# Patient Record
Sex: MALE | Race: WHITE | Employment: UNEMPLOYED | URBAN - METROPOLITAN AREA
[De-identification: names, ages, dates, MRNs, and addresses within clinical notes are randomized per-mention and may not be internally consistent; named-entity substitution may affect disease eponyms.]

---

## 2019-05-18 ENCOUNTER — HOSPITAL ENCOUNTER (EMERGENCY)
Age: 49
Discharge: HOME | End: 2019-05-18
Payer: COMMERCIAL

## 2019-05-18 VITALS
DIASTOLIC BLOOD PRESSURE: 78 MMHG | OXYGEN SATURATION: 95 % | WEIGHT: 210 LBS | BODY MASS INDEX: 28.44 KG/M2 | HEART RATE: 74 BPM | HEIGHT: 72 IN | TEMPERATURE: 97.6 F | RESPIRATION RATE: 19 BRPM | SYSTOLIC BLOOD PRESSURE: 135 MMHG

## 2019-05-18 DIAGNOSIS — K02.9 PAIN DUE TO DENTAL CARIES: ICD-10-CM

## 2019-05-18 DIAGNOSIS — K04.7 DENTAL ABSCESS: Primary | ICD-10-CM

## 2019-05-18 PROCEDURE — 6370000000 HC RX 637 (ALT 250 FOR IP): Performed by: NURSE PRACTITIONER

## 2019-05-18 PROCEDURE — 99282 EMERGENCY DEPT VISIT SF MDM: CPT

## 2019-05-18 RX ORDER — IBUPROFEN 800 MG/1
800 TABLET ORAL EVERY 6 HOURS PRN
Qty: 21 TABLET | Refills: 0 | Status: SHIPPED | OUTPATIENT
Start: 2019-05-18 | End: 2019-07-02 | Stop reason: ALTCHOICE

## 2019-05-18 RX ORDER — IBUPROFEN 800 MG/1
800 TABLET ORAL ONCE
Status: COMPLETED | OUTPATIENT
Start: 2019-05-18 | End: 2019-05-18

## 2019-05-18 RX ORDER — OXYCODONE HYDROCHLORIDE AND ACETAMINOPHEN 5; 325 MG/1; MG/1
1 TABLET ORAL ONCE
Status: COMPLETED | OUTPATIENT
Start: 2019-05-18 | End: 2019-05-18

## 2019-05-18 RX ORDER — PENICILLIN V POTASSIUM 500 MG/1
500 TABLET ORAL 4 TIMES DAILY
Qty: 40 TABLET | Refills: 0 | Status: SHIPPED | OUTPATIENT
Start: 2019-05-18 | End: 2019-05-28

## 2019-05-18 RX ADMIN — IBUPROFEN 800 MG: 800 TABLET ORAL at 14:01

## 2019-05-18 RX ADMIN — OXYCODONE HYDROCHLORIDE AND ACETAMINOPHEN 1 TABLET: 5; 325 TABLET ORAL at 12:15

## 2019-05-18 ASSESSMENT — PAIN SCALES - GENERAL
PAINLEVEL_OUTOF10: 9
PAINLEVEL_OUTOF10: 8

## 2019-05-18 NOTE — PROGRESS NOTES
S: Pt presents with a complaint of dental pain and left facial swelling. The patient states this pain has beem experiencing pain for the last few days  and worse today which is what prompted the visit. Pain has not been relieved with any OTC medications. Patient states that the swelling came on last night. Patient denies any fever. The pt  denies difficulty breathing or swallowing. The location of the pain and appears to be isolated over tooth #19/20. O: 58 y/o  male  ASA II   IO exam: limited exam performed ;missing teeth, caries, fractured teeth, fluctuant swelling in the area of tooth # 19 and 20     Tooth #19 dental Caries, fractured, tender to palpation and percussion    EO exam : left facial swelling, slightly indurated, tender to palpation    P:Diagnosis and treatment explained to pt. Pt explained that incision and drainage needs to be performed on the lower left side. Pt informed that he needs to follow up at the dental clinic Monday morning at 8:00 am or 1:00pm clinic for further evaluation and needed treatment. Pt understands and agrees. 20 % benzocaine applied. 2 carps of 2 % lidocaine 1:100K used to anesthetize. # 15 used to place an incision in the area of tooth # 19. Area irrigated with sterile water using monojet. Johnsie Ronde drain placed using silk suture. Pt prescribed Amoxicillin, Ibuprofen and Tylenol    Follow up Dental clinic Monday Morning     Dr. Yao , STEPH    I discussed the case, including pertinent history and exam findings with the resident and the team. I agree with the assessment, plan, and treatment as documented by the resident. The ED Physician provided direct supervision.    Electronically signed by Ren Webster DDS

## 2019-05-18 NOTE — ED PROVIDER NOTES
ED Attending  CC: No    HPI: Russel Yan 57 y.o. male with a past medical history of History reviewed. No pertinent past medical history. presents with a complaint of dental pain. The patient states this pain has been gradual in onset, persistent, moderate in severity and worse today which is what prompted the visit. Pain has not been relieved with any OTC medications. Patient denies any unilateral facial swelling. Patient is able to handle their own secretions and drink fluids without difficulty. Patient denies any fever. The patient also denies any history of dental trauma. Denies difficulty breathing or swallowing. The location of the pain and appears to be isolated over tooth number 18, 19. Onset last night and progressively worse. No fever     ROS:   Pertinent positives and negatives are stated within HPI, all other systems reviewed and are negative.  --------------------------------------------- PAST HISTORY ---------------------------------------------  Past Medical History:  has no past medical history on file. Past Surgical History:  has a past surgical history that includes Nose surgery. Social History:  reports that he has been smoking. He has been smoking about 1.00 pack per day. He does not have any smokeless tobacco history on file. He reports that he drinks alcohol. He reports that he has current or past drug history. Drug: Opiates . Family History: family history includes Cancer in his father and mother. The patients home medications have been reviewed. Allergies: Patient has no known allergies. ------------------------- NURSING NOTES AND VITALS REVIEWED ---------------------------   The nursing notes within the ED encounter and vital signs as below have been reviewed by myself.   /78   Pulse 74   Temp 97.6 °F (36.4 °C)   Resp 19   Ht 6' (1.829 m)   Wt 210 lb (95.3 kg)   SpO2 95%   BMI 28.48 kg/m²   Oxygen Saturation Interpretation: Normal    The patients available past medical records and past encounters were reviewed. Physical exam:  Constitutional: The patient is comfortable, alert and oriented x3, well appearing, non toxic in NAD. Head: Atraumatic and normocephalic. Eyes: No discharge from the eyes the sclerae are normal.  ENT: The oropharynx is normal. No pharyngeal erythema, uvular edema, tonsillar exudates, asymmetry or trismus. Mouth is normal to inspection  With the exception of a pain on percussion of the tooth noted above. There is evidence of facial asymmetry and is abscess formation. Floor of the mouth is soft. No tenderness in the submental or submandibular space. No tongue elevation. Significant dental caries noted at the stated teeth with evidence of abscess formation. Neck: The neck demonstrates normal range of motion. No meningeals signs are present. No stridor. Respiratory/chest: The chest is nontender. Breath sounds are normal. no respiratory distress is noted  Cardiovascular: Heart shows a regular rate and rhythm no murmurs no rubs no gallops. Skin: The skin exam shows no evidence of rashes  Neuro: GCS is 15  Lymphatic: No cervical lymphadenopathy       -------------------------------------------------- RESULTS -------------------------------------------------  I have personally reviewed all laboratory and imaging results for this patient. Results are listed below. LABS:  No results found for this visit on 05/18/19. RADIOLOGY:  Interpreted by Radiologist.  No orders to display          1846- call to dental resident will be in to eval the patient  1400- provided with information to follow the dental clinic on Monday morning and started on antibiotics.     Medical Decision Making: Exam and history c/w  dental pain without evidence of gross infection.  At this time we will  the patient on following up in dental clinic and provide pain relief.         --------------------------------- IMPRESSION AND DISPOSITION ---------------------------------    IMPRESSION  1. Dental abscess    2.  Pain due to dental caries        DISPOSITION  Disposition: Discharge to home  Patient condition is good              Isaias Burton, MYNOR - ADRIAN  05/18/19 [id number]

## 2019-07-02 ENCOUNTER — HOSPITAL ENCOUNTER (EMERGENCY)
Age: 49
Discharge: HOME | End: 2019-07-02
Payer: COMMERCIAL

## 2019-07-02 VITALS
RESPIRATION RATE: 16 BRPM | OXYGEN SATURATION: 97 % | SYSTOLIC BLOOD PRESSURE: 133 MMHG | HEART RATE: 100 BPM | BODY MASS INDEX: 35.55 KG/M2 | TEMPERATURE: 97.7 F | WEIGHT: 240 LBS | DIASTOLIC BLOOD PRESSURE: 91 MMHG | HEIGHT: 69 IN

## 2019-07-02 DIAGNOSIS — M54.32 SCIATICA OF LEFT SIDE: Primary | ICD-10-CM

## 2019-07-02 PROCEDURE — 96372 THER/PROPH/DIAG INJ SC/IM: CPT

## 2019-07-02 PROCEDURE — 99282 EMERGENCY DEPT VISIT SF MDM: CPT

## 2019-07-02 PROCEDURE — 6360000002 HC RX W HCPCS: Performed by: PHYSICIAN ASSISTANT

## 2019-07-02 RX ORDER — KETOROLAC TROMETHAMINE 30 MG/ML
30 INJECTION, SOLUTION INTRAMUSCULAR; INTRAVENOUS ONCE
Status: COMPLETED | OUTPATIENT
Start: 2019-07-02 | End: 2019-07-02

## 2019-07-02 RX ORDER — PREDNISONE 10 MG/1
40 TABLET ORAL DAILY
Qty: 20 TABLET | Refills: 0 | Status: SHIPPED | OUTPATIENT
Start: 2019-07-02 | End: 2019-07-07

## 2019-07-02 RX ORDER — CYCLOBENZAPRINE HCL 10 MG
10 TABLET ORAL 3 TIMES DAILY PRN
Qty: 15 TABLET | Refills: 0 | Status: SHIPPED | OUTPATIENT
Start: 2019-07-02 | End: 2019-07-07

## 2019-07-02 RX ADMIN — KETOROLAC TROMETHAMINE 30 MG: 30 INJECTION, SOLUTION INTRAMUSCULAR; INTRAVENOUS at 09:05

## 2019-07-02 ASSESSMENT — PAIN DESCRIPTION - PAIN TYPE: TYPE: CHRONIC PAIN

## 2019-07-02 ASSESSMENT — PAIN SCALES - GENERAL
PAINLEVEL_OUTOF10: 10
PAINLEVEL_OUTOF10: 10

## 2019-07-02 ASSESSMENT — PAIN DESCRIPTION - LOCATION: LOCATION: BACK

## 2019-07-02 ASSESSMENT — PAIN DESCRIPTION - ORIENTATION: ORIENTATION: LOWER

## 2019-07-02 NOTE — ED PROVIDER NOTES
kg)      Oxygen Saturation Interpretation: Normal.    Constitutional:  Alert, development consistent with age. HEENT:  NC/NT. Airway patent. Neck:  Normal ROM. Supple. Respiratory:  Clear to auscultation and breath sounds equal.  CV:  Regular rate and rhythm, normal heart sounds, without pathological murmurs, ectopy, gallops, or rubs. GI:  Abdomen Soft, nontender, good bowel sounds. No firm or pulsatile mass. Back: lower lumbar spine and sacral spine left sided. Tenderness: Mild. Swelling: no.              Range of Motion: full range with pain. CVA Tenderness: No.            Straight leg raising:  Left positive            Skin:  no erythema, rash or swelling noted. Distal Function:              Motor deficit: none. Sensory deficit: none. Pulse deficit: none. Calf Tenderness:  No Bilateral.               Edema:  none Both lower extremity(s). Reflexes: Bilateral knee,ankle,biceps normal.  Gait:  normal.  Integument:  Normal turgor. Warm, dry, without visible rash. Lymphatics: No lymphangitis or adenopathy noted. Neurological:  Oriented. Motor functions intact. Lab / Imaging Results   (All laboratory and radiology results have been personally reviewed by myself)  Labs:  No results found for this visit on 07/02/19. Imaging: All Radiology results interpreted by Radiologist unless otherwise noted. No orders to display       ED Course / Medical Decision Making     Medications   ketorolac (TORADOL) injection 30 mg (has no administration in time range)     Consult(s):   None    Procedure(s):   none    Medical Decision Making:    Films were not obtained based on negative suspicion for bony injury as per history/physical findings . Kalina Mota At this time the patient is without objective evidence of an acute process requiring inpatient management. No injury or trauma. No midline spinal tenderness.  No focal deficits noted on exam.

## 2020-08-24 ENCOUNTER — HOSPITAL ENCOUNTER (EMERGENCY)
Age: 50
Discharge: HOME | End: 2020-08-24
Payer: COMMERCIAL

## 2020-08-24 VITALS
RESPIRATION RATE: 20 BRPM | DIASTOLIC BLOOD PRESSURE: 92 MMHG | OXYGEN SATURATION: 96 % | SYSTOLIC BLOOD PRESSURE: 148 MMHG | HEART RATE: 95 BPM | TEMPERATURE: 97.9 F

## 2020-08-24 PROCEDURE — 99282 EMERGENCY DEPT VISIT SF MDM: CPT

## 2020-08-24 PROCEDURE — 96372 THER/PROPH/DIAG INJ SC/IM: CPT

## 2020-08-24 PROCEDURE — 6360000002 HC RX W HCPCS: Performed by: PHYSICIAN ASSISTANT

## 2020-08-24 PROCEDURE — 12001 RPR S/N/AX/GEN/TRNK 2.5CM/<: CPT

## 2020-08-24 PROCEDURE — 6370000000 HC RX 637 (ALT 250 FOR IP): Performed by: NURSE PRACTITIONER

## 2020-08-24 PROCEDURE — 90715 TDAP VACCINE 7 YRS/> IM: CPT | Performed by: PHYSICIAN ASSISTANT

## 2020-08-24 PROCEDURE — 90471 IMMUNIZATION ADMIN: CPT | Performed by: PHYSICIAN ASSISTANT

## 2020-08-24 PROCEDURE — 2500000003 HC RX 250 WO HCPCS: Performed by: NURSE PRACTITIONER

## 2020-08-24 RX ORDER — LIDOCAINE HYDROCHLORIDE AND EPINEPHRINE 10; 10 MG/ML; UG/ML
20 INJECTION, SOLUTION INFILTRATION; PERINEURAL ONCE
Status: COMPLETED | OUTPATIENT
Start: 2020-08-24 | End: 2020-08-24

## 2020-08-24 RX ORDER — DIAPER,BRIEF,INFANT-TODD,DISP
EACH MISCELLANEOUS ONCE
Status: COMPLETED | OUTPATIENT
Start: 2020-08-24 | End: 2020-08-24

## 2020-08-24 RX ORDER — CEPHALEXIN 500 MG/1
500 CAPSULE ORAL 4 TIMES DAILY
Qty: 28 CAPSULE | Refills: 0 | Status: SHIPPED | OUTPATIENT
Start: 2020-08-24 | End: 2020-08-31

## 2020-08-24 RX ORDER — IBUPROFEN 400 MG/1
400 TABLET ORAL ONCE
Status: COMPLETED | OUTPATIENT
Start: 2020-08-24 | End: 2020-08-24

## 2020-08-24 RX ADMIN — TETANUS TOXOID, REDUCED DIPHTHERIA TOXOID AND ACELLULAR PERTUSSIS VACCINE, ADSORBED 0.5 ML: 5; 2.5; 8; 8; 2.5 SUSPENSION INTRAMUSCULAR at 19:15

## 2020-08-24 RX ADMIN — IBUPROFEN 400 MG: 400 TABLET, FILM COATED ORAL at 19:14

## 2020-08-24 RX ADMIN — BACITRACIN ZINC: 500 OINTMENT TOPICAL at 19:15

## 2020-08-24 RX ADMIN — LIDOCAINE HYDROCHLORIDE,EPINEPHRINE BITARTRATE 20 ML: 10; .01 INJECTION, SOLUTION INFILTRATION; PERINEURAL at 19:15

## 2020-08-24 ASSESSMENT — PAIN SCALES - GENERAL: PAINLEVEL_OUTOF10: 1

## 2020-08-24 NOTE — ED PROVIDER NOTES
[facility]       Department of Emergency Medicine   ED  Provider Note  Admit Date/RoomTime: 8/24/2020  6:40 PM  ED Room: [address] [place] [address]  Chief Complaint:   Laceration (pt cut left wrist on piece of steel .)    History of Present Illness   Source of history provided by:  patient. History/Exam Limitations: none. Marta Hastings is a 54 y.o. old male presenting to the emergency department by private vehicle, for a laceration to the left wrist, caused by metal edge, which occurred at home approximately 1 hour(s) prior to arrival.  There is not a possibility of retained foreign body in the affected area. The patients tetanus status is unknown. Bleeding is  controlled. There is mild pain at injury site. ROS    Pertinent positives and negatives are stated within HPI, all other systems reviewed and are negative. Past Medical History:  has no past medical history on file. Past Surgical History:  has a past surgical history that includes Nose surgery. Social History:  reports that he has been smoking. He has been smoking about 1.00 pack per day. He does not have any smokeless tobacco history on file. He reports current alcohol use. He reports current drug use. Drug: Opiates . Family History: family history includes Cancer in his father and mother. Allergies: Patient has no known allergies. Physical Exam           ED Triage Vitals   BP Temp Temp src Pulse Resp SpO2 Height Weight   08/24/20 1839 08/24/20 1822 -- 08/24/20 1822 08/24/20 1839 08/24/20 1822 -- --   (!) 148/92 97.9 °F (36.6 °C)  95 20 96 %        Oxygen Saturation Interpretation: Normal.    Constitutional:  Alert, development consistent with age. Neck:  Normal ROM. Supple. Non-tender. Wrist: Left volar/ulnar aspect             Tenderness: mild. Swelling: None. Deformity: no.             Skin: There is a linear laceration measuring 2 cm without foreign body or tendon injury. Bleeding is controlled. Neurovascular: Motor deficit: none. Sensory deficit: none. Pulse deficit: none. Capillary refill: normal.  Fingers:  all            Tenderness:  none. Swelling: None. Deformity: no.              ROM: full range of motion. Skin:  no erythema, rash or wounds noted. Hand:               Tenderness:  none. Swelling: None. Deformity: no.             ROM: full range of motion. Skin:  no erythema, rash or wounds noted. Lymphatics: No lymphangitis or adenopathy noted. Neurological:  Oriented. Motor functions intact. t. Lab / Imaging Results   (All laboratory and radiology results have been personally reviewed by myself)  Labs:  No results found for this visit on 08/24/20. Imaging: All Radiology results interpreted by Radiologist unless otherwise noted. No orders to display     ED Course / Medical Decision Making     Medications   Tetanus-Diphth-Acell Pertussis (BOOSTRIX) injection 0.5 mL (0.5 mLs Intramuscular Given 8/24/20 1915)   ibuprofen (ADVIL;MOTRIN) tablet 400 mg (400 mg Oral Given 8/24/20 1914)   bacitracin zinc ointment ( Topical Given 8/24/20 1915)   lidocaine-EPINEPHrine 1 percent-1:295016 injection 20 mL (20 mLs Intradermal Given 8/24/20 1915)     Consult(s):   None    Procedure(s):     PROCEDURE NOTE  8/24/20       Time: 1900    LACERATION REPAIR  Risks, benefits and alternatives (for applicable procedures below) described. Performed By: MYNOR Mcmanus CNP. Laceration #: 1. Location: left wrist  Length: 2 cm. The wound area was irrigated with sterile saline, cleansend with shur-clens and draped in a sterile fashion. Local Anesthesia:  Lidocaine 1% with epinephrine. The wound was explored with the following results:  no foreign body or tendon injury seen. Debridement: None. Undermining: None. Wound Margins Revised: None. Flaps Aligned: yes.   The wound was closed with 4-0 Ethilon using interrupted sutures. Dressing:  bacitracin and a sterile dressing was placed. Total number suture:  4. There were no additional lacerations requiring repair. MDM:   Patient's tetanus was updated. There is no tendon injury or suspicion of foreign body therefore no diagnostic imaging. The wound was repaired. Wound care was discussed and plan is for soap and water twice daily with bacitracin ointment as provided in the emergency department. He was given a prescription for watchful waiting to start should signs and symptoms indicative of cellulitis develop. Otherwise he will return for suture removal in 10 to 14 days if unable to be seen by internal medicine clinic or [facility] care. Patient is well-appearing, nontoxic and appropriate for outpatient treatment and management at the time of exam and departed in stable condition. Counseling: The emergency provider has spoken with the patient and discussed todays results, in addition to providing specific details for the plan of care and counseling regarding the diagnosis and prognosis. Questions are answered at this time and they are agreeable with the plan. Assessment      1. Laceration of left wrist, initial encounter      Plan   Discharge to home  Patient condition is stable    New Medications     Discharge Medication List as of 8/24/2020  7:18 PM      START taking these medications    Details   cephALEXin (KEFLEX) 500 MG capsule Take 1 capsule by mouth 4 times daily for 7 days, Disp-28 capsule,R-0Print           Electronically signed by MYNOR Mayers CNP   DD: 8/24/20  **This report was transcribed using voice recognition software. Every effort was made to ensure accuracy; however, inadvertent computerized transcription errors may be present.   END OF ED PROVIDER NOTE       MYNOR Mayers CNP  08/24/20 2015

## 2021-02-11 ENCOUNTER — HOSPITAL ENCOUNTER (EMERGENCY)
Age: 51
Discharge: HOME | End: 2021-02-11
Payer: COMMERCIAL

## 2021-02-11 VITALS
WEIGHT: 220 LBS | BODY MASS INDEX: 32.58 KG/M2 | RESPIRATION RATE: 17 BRPM | SYSTOLIC BLOOD PRESSURE: 168 MMHG | HEIGHT: 69 IN | HEART RATE: 78 BPM | TEMPERATURE: 97.7 F | DIASTOLIC BLOOD PRESSURE: 100 MMHG | OXYGEN SATURATION: 98 %

## 2021-02-11 DIAGNOSIS — R22.0 FACIAL SWELLING: ICD-10-CM

## 2021-02-11 DIAGNOSIS — K08.89 PAIN, DENTAL: Primary | ICD-10-CM

## 2021-02-11 PROCEDURE — 6370000000 HC RX 637 (ALT 250 FOR IP): Performed by: PHYSICIAN ASSISTANT

## 2021-02-11 PROCEDURE — 99284 EMERGENCY DEPT VISIT MOD MDM: CPT

## 2021-02-11 RX ORDER — IBUPROFEN 400 MG/1
600 TABLET ORAL ONCE
Status: COMPLETED | OUTPATIENT
Start: 2021-02-11 | End: 2021-02-11

## 2021-02-11 RX ORDER — IBUPROFEN 600 MG/1
600 TABLET ORAL 3 TIMES DAILY PRN
Qty: 30 TABLET | Refills: 0 | Status: SHIPPED | OUTPATIENT
Start: 2021-02-11 | End: 2021-03-15

## 2021-02-11 RX ORDER — AMOXICILLIN AND CLAVULANATE POTASSIUM 875; 125 MG/1; MG/1
1 TABLET, FILM COATED ORAL 2 TIMES DAILY
Qty: 14 TABLET | Refills: 0 | Status: SHIPPED | OUTPATIENT
Start: 2021-02-11 | End: 2021-02-18

## 2021-02-11 RX ORDER — AMOXICILLIN AND CLAVULANATE POTASSIUM 875; 125 MG/1; MG/1
1 TABLET, FILM COATED ORAL ONCE
Status: COMPLETED | OUTPATIENT
Start: 2021-02-11 | End: 2021-02-11

## 2021-02-11 RX ADMIN — AMOXICILLIN AND CLAVULANATE POTASSIUM 1 TABLET: 875; 125 TABLET, FILM COATED ORAL at 17:42

## 2021-02-11 RX ADMIN — IBUPROFEN 600 MG: 400 TABLET ORAL at 17:42

## 2021-02-11 ASSESSMENT — ENCOUNTER SYMPTOMS
COLOR CHANGE: 0
SHORTNESS OF BREATH: 0
TROUBLE SWALLOWING: 0
CHEST TIGHTNESS: 0
SORE THROAT: 0
FACIAL SWELLING: 1
COUGH: 0

## 2021-02-11 ASSESSMENT — PAIN SCALES - GENERAL: PAINLEVEL_OUTOF10: 8

## 2021-02-11 NOTE — ED PROVIDER NOTES
Drug: Opiates . Family History: family history includes Cancer in his father and mother. The patients home medications have been reviewed. Allergies: Patient has no known allergies. -------------------------------------------------- RESULTS -------------------------------------------------  All laboratory and radiology results have been personally reviewed by myself   LABS:  No results found for this visit on 02/11/21. RADIOLOGY:  Interpreted by Radiologist.  No orders to display       ------------------------- NURSING NOTES AND VITALS REVIEWED ---------------------------   The nursing notes within the ED encounter and vital signs as below have been reviewed. BP (!) 175/131   Pulse 79   Temp 97.7 °F (36.5 °C)   Ht 5' 9\" (1.753 m)   Wt 220 lb (99.8 kg)   SpO2 97%   BMI 32.49 kg/m²   Oxygen Saturation Interpretation: Normal      ---------------------------------------------------PHYSICAL EXAM--------------------------------------    Physical Exam  Vitals signs and nursing note reviewed. Constitutional:       General: He is not in acute distress. Appearance: Normal appearance. He is well-developed. He is not ill-appearing. HENT:      Head: Normocephalic and atraumatic. Ears:      Comments: Overall poor dentition. Decay and partial absence of the right upper last molar. Significant decay to right upper second molar. No gingival edema. Mild edema over the right maxillary sinus but no mass palpated. No erythema, warmth or streaking. Mouth/Throat:      Mouth: Mucous membranes are moist.   Neck:      Musculoskeletal: Normal range of motion and neck supple. No neck rigidity. Cardiovascular:      Rate and Rhythm: Normal rate and regular rhythm. Heart sounds: Normal heart sounds. No murmur. Pulmonary:      Effort: Pulmonary effort is normal. No respiratory distress. Breath sounds: Normal breath sounds.    Abdominal:      General: Bowel sounds are normal. There is no distension. Palpations: Abdomen is soft. Tenderness: There is no abdominal tenderness. Musculoskeletal: Normal range of motion. General: No swelling, tenderness or deformity. Skin:     General: Skin is warm and dry. Capillary Refill: Capillary refill takes less than 2 seconds. Findings: No erythema. Neurological:      General: No focal deficit present. Mental Status: He is alert and oriented to person, place, and time. Mental status is at baseline. Coordination: Coordination normal.   Psychiatric:         Mood and Affect: Mood normal.         Behavior: Behavior normal.         Thought Content: Thought content normal.            ------------------------------ ED COURSE/MEDICAL DECISION MAKING----------------------  Medications   amoxicillin-clavulanate (AUGMENTIN) 875-125 MG per tablet 1 tablet (1 tablet Oral Given 2/11/21 1742)   ibuprofen (ADVIL;MOTRIN) tablet 600 mg (600 mg Oral Given 2/11/21 1742)         ED COURSE:      No orders to display         Procedures:  Procedures     Medical Decision Making:   MDM   59-year-old male presenting the ED with right sided dental pain and facial swelling that started this morning. Patient does have poor dentition with overlying decay. He also has mild facial swelling but there is no presence of facial cellulitis, involvement of the airway or abscess at this time. He is given ibuprofen and Augmentin. He is encouraged to go to the dental clinic tomorrow for walk-in hours. He is to return with any new or worsening symptoms. Counseling: The emergency provider has spoken with the patient and discussed todays results, in addition to providing specific details for the plan of care and counseling regarding the diagnosis and prognosis.   Questions are answered at this time and they are agreeable with the plan.      --------------------------------- IMPRESSION AND DISPOSITION ---------------------------------    IMPRESSION  1.

## 2021-03-15 ENCOUNTER — HOSPITAL ENCOUNTER (OUTPATIENT)
Age: 51
Discharge: HOME | End: 2021-03-15
Payer: COMMERCIAL

## 2021-03-15 ENCOUNTER — HOSPITAL ENCOUNTER (OUTPATIENT)
Dept: GENERAL RADIOLOGY | Age: 51
Discharge: HOME | End: 2021-03-17
Payer: COMMERCIAL

## 2021-03-15 ENCOUNTER — HOSPITAL ENCOUNTER (OUTPATIENT)
Age: 51
Discharge: HOME | End: 2021-03-17
Payer: COMMERCIAL

## 2021-03-15 ENCOUNTER — OFFICE VISIT (OUTPATIENT)
Dept: INTERNAL MEDICINE | Age: 51
End: 2021-03-15
Payer: COMMERCIAL

## 2021-03-15 VITALS
HEIGHT: 69 IN | BODY MASS INDEX: 37.65 KG/M2 | TEMPERATURE: 97.1 F | DIASTOLIC BLOOD PRESSURE: 91 MMHG | RESPIRATION RATE: 18 BRPM | SYSTOLIC BLOOD PRESSURE: 157 MMHG | HEART RATE: 72 BPM | WEIGHT: 254.2 LBS

## 2021-03-15 DIAGNOSIS — D64.89 ANEMIA DUE TO OTHER CAUSE, NOT CLASSIFIED: ICD-10-CM

## 2021-03-15 DIAGNOSIS — M79.642 PAIN IN BOTH HANDS: Primary | ICD-10-CM

## 2021-03-15 DIAGNOSIS — Z13.220 SCREENING FOR HYPERLIPIDEMIA: ICD-10-CM

## 2021-03-15 DIAGNOSIS — M79.642 PAIN IN BOTH HANDS: ICD-10-CM

## 2021-03-15 DIAGNOSIS — E87.8 ELECTROLYTE ABNORMALITY: ICD-10-CM

## 2021-03-15 DIAGNOSIS — E63.8 NUTRITION DEFICIENCY DUE TO A PARTICULAR KIND OF FOOD: ICD-10-CM

## 2021-03-15 DIAGNOSIS — G89.29 CHRONIC LEFT-SIDED LOW BACK PAIN, UNSPECIFIED WHETHER SCIATICA PRESENT: ICD-10-CM

## 2021-03-15 DIAGNOSIS — Z11.4 SCREENING FOR HUMAN IMMUNODEFICIENCY VIRUS: ICD-10-CM

## 2021-03-15 DIAGNOSIS — M79.641 PAIN IN BOTH HANDS: ICD-10-CM

## 2021-03-15 DIAGNOSIS — Z21 ASYMPTOMATIC HIV INFECTION (HCC): ICD-10-CM

## 2021-03-15 DIAGNOSIS — M79.641 PAIN IN BOTH HANDS: Primary | ICD-10-CM

## 2021-03-15 DIAGNOSIS — M54.50 CHRONIC LEFT-SIDED LOW BACK PAIN, UNSPECIFIED WHETHER SCIATICA PRESENT: ICD-10-CM

## 2021-03-15 DIAGNOSIS — Z11.59 NEED FOR HEPATITIS C SCREENING TEST: ICD-10-CM

## 2021-03-15 DIAGNOSIS — Z13.1 SCREENING FOR DIABETES MELLITUS: ICD-10-CM

## 2021-03-15 DIAGNOSIS — E55.9 VITAMIN D DEFICIENCY: ICD-10-CM

## 2021-03-15 LAB
ALBUMIN SERPL-MCNC: 4.4 G/DL (ref 3.5–5.2)
ALP BLD-CCNC: 65 U/L (ref 40–129)
ALT SERPL-CCNC: 42 U/L (ref 0–40)
ANION GAP SERPL CALCULATED.3IONS-SCNC: 11 MMOL/L (ref 7–16)
AST SERPL-CCNC: 31 U/L (ref 0–39)
BASOPHILS ABSOLUTE: 0.08 E9/L (ref 0–0.2)
BASOPHILS RELATIVE PERCENT: 1.4 % (ref 0–2)
BILIRUB SERPL-MCNC: 0.3 MG/DL (ref 0–1.2)
BUN BLDV-MCNC: 10 MG/DL (ref 6–20)
C-REACTIVE PROTEIN: 0.3 MG/DL (ref 0–0.4)
CALCIUM SERPL-MCNC: 9.2 MG/DL (ref 8.6–10.2)
CHLORIDE BLD-SCNC: 101 MMOL/L (ref 98–107)
CHOLESTEROL, TOTAL: 139 MG/DL (ref 0–199)
CO2: 25 MMOL/L (ref 22–29)
CREAT SERPL-MCNC: 0.7 MG/DL (ref 0.7–1.2)
EOSINOPHILS ABSOLUTE: 0.12 E9/L (ref 0.05–0.5)
EOSINOPHILS RELATIVE PERCENT: 2.1 % (ref 0–6)
FOLATE: 10.6 NG/ML (ref 4.8–24.2)
GFR AFRICAN AMERICAN: >60
GFR NON-AFRICAN AMERICAN: >60 ML/MIN/1.73
GLUCOSE BLD-MCNC: 99 MG/DL (ref 74–99)
HBA1C MFR BLD: 5.4 %
HCT VFR BLD CALC: 45.1 % (ref 37–54)
HDLC SERPL-MCNC: 31 MG/DL
HEMOGLOBIN: 15.7 G/DL (ref 12.5–16.5)
IMMATURE GRANULOCYTES #: 0.02 E9/L
IMMATURE GRANULOCYTES %: 0.3 % (ref 0–5)
LDL CHOLESTEROL CALCULATED: 87 MG/DL (ref 0–99)
LYMPHOCYTES ABSOLUTE: 1.58 E9/L (ref 1.5–4)
LYMPHOCYTES RELATIVE PERCENT: 27.2 % (ref 20–42)
MCH RBC QN AUTO: 30.6 PG (ref 26–35)
MCHC RBC AUTO-ENTMCNC: 34.8 % (ref 32–34.5)
MCV RBC AUTO: 87.9 FL (ref 80–99.9)
MONOCYTES ABSOLUTE: 0.36 E9/L (ref 0.1–0.95)
MONOCYTES RELATIVE PERCENT: 6.2 % (ref 2–12)
NEUTROPHILS ABSOLUTE: 3.64 E9/L (ref 1.8–7.3)
NEUTROPHILS RELATIVE PERCENT: 62.8 % (ref 43–80)
PDW BLD-RTO: 12.5 FL (ref 11.5–15)
PLATELET # BLD: 216 E9/L (ref 130–450)
PMV BLD AUTO: 9.7 FL (ref 7–12)
POTASSIUM SERPL-SCNC: 4.4 MMOL/L (ref 3.5–5)
RBC # BLD: 5.13 E12/L (ref 3.8–5.8)
RHEUMATOID FACTOR: <10 IU/ML (ref 0–13)
SEDIMENTATION RATE, ERYTHROCYTE: 11 MM/HR (ref 0–15)
SODIUM BLD-SCNC: 137 MMOL/L (ref 132–146)
TOTAL PROTEIN: 7.9 G/DL (ref 6.4–8.3)
TRIGL SERPL-MCNC: 105 MG/DL (ref 0–149)
VITAMIN B-12: 553 PG/ML (ref 211–946)
VITAMIN D 25-HYDROXY: 34 NG/ML (ref 30–100)
VLDLC SERPL CALC-MCNC: 21 MG/DL
WBC # BLD: 5.8 E9/L (ref 4.5–11.5)

## 2021-03-15 PROCEDURE — 85651 RBC SED RATE NONAUTOMATED: CPT

## 2021-03-15 PROCEDURE — 82607 VITAMIN B-12: CPT

## 2021-03-15 PROCEDURE — 80074 ACUTE HEPATITIS PANEL: CPT

## 2021-03-15 PROCEDURE — 72100 X-RAY EXAM L-S SPINE 2/3 VWS: CPT

## 2021-03-15 PROCEDURE — 73130 X-RAY EXAM OF HAND: CPT

## 2021-03-15 PROCEDURE — 86200 CCP ANTIBODY: CPT

## 2021-03-15 PROCEDURE — 82306 VITAMIN D 25 HYDROXY: CPT

## 2021-03-15 PROCEDURE — 99204 OFFICE O/P NEW MOD 45 MIN: CPT | Performed by: INTERNAL MEDICINE

## 2021-03-15 PROCEDURE — 99202 OFFICE O/P NEW SF 15 MIN: CPT | Performed by: INTERNAL MEDICINE

## 2021-03-15 PROCEDURE — 4004F PT TOBACCO SCREEN RCVD TLK: CPT | Performed by: INTERNAL MEDICINE

## 2021-03-15 PROCEDURE — G8484 FLU IMMUNIZE NO ADMIN: HCPCS | Performed by: INTERNAL MEDICINE

## 2021-03-15 PROCEDURE — 85025 COMPLETE CBC W/AUTO DIFF WBC: CPT

## 2021-03-15 PROCEDURE — G8427 DOCREV CUR MEDS BY ELIG CLIN: HCPCS | Performed by: INTERNAL MEDICINE

## 2021-03-15 PROCEDURE — 83036 HEMOGLOBIN GLYCOSYLATED A1C: CPT | Performed by: INTERNAL MEDICINE

## 2021-03-15 PROCEDURE — 86703 HIV-1/HIV-2 1 RESULT ANTBDY: CPT

## 2021-03-15 PROCEDURE — 80053 COMPREHEN METABOLIC PANEL: CPT

## 2021-03-15 PROCEDURE — G8417 CALC BMI ABV UP PARAM F/U: HCPCS | Performed by: INTERNAL MEDICINE

## 2021-03-15 PROCEDURE — 36415 COLL VENOUS BLD VENIPUNCTURE: CPT

## 2021-03-15 PROCEDURE — 86431 RHEUMATOID FACTOR QUANT: CPT

## 2021-03-15 PROCEDURE — 80061 LIPID PANEL: CPT

## 2021-03-15 PROCEDURE — 82746 ASSAY OF FOLIC ACID SERUM: CPT

## 2021-03-15 PROCEDURE — 86140 C-REACTIVE PROTEIN: CPT

## 2021-03-15 RX ORDER — METHYLPREDNISOLONE 4 MG/1
4 TABLET ORAL DAILY
COMMUNITY
Start: 2021-02-15 | End: 2021-04-23 | Stop reason: ALTCHOICE

## 2021-03-15 RX ORDER — PSEUDOEPHED/ACETAMINOPH/DIPHEN 30MG-500MG
TABLET ORAL
COMMUNITY
Start: 2021-02-15 | End: 2021-12-14 | Stop reason: ALTCHOICE

## 2021-03-15 RX ORDER — IBUPROFEN 800 MG/1
800 TABLET ORAL EVERY 8 HOURS PRN
Qty: 60 TABLET | Refills: 0 | Status: SHIPPED
Start: 2021-03-15 | End: 2021-07-07

## 2021-03-15 SDOH — ECONOMIC STABILITY: INCOME INSECURITY: HOW HARD IS IT FOR YOU TO PAY FOR THE VERY BASICS LIKE FOOD, HOUSING, MEDICAL CARE, AND HEATING?: NOT HARD AT ALL

## 2021-03-15 SDOH — ECONOMIC STABILITY: TRANSPORTATION INSECURITY
IN THE PAST 12 MONTHS, HAS LACK OF TRANSPORTATION KEPT YOU FROM MEETINGS, WORK, OR FROM GETTING THINGS NEEDED FOR DAILY LIVING?: NO

## 2021-03-15 SDOH — ECONOMIC STABILITY: FOOD INSECURITY: WITHIN THE PAST 12 MONTHS, THE FOOD YOU BOUGHT JUST DIDN'T LAST AND YOU DIDN'T HAVE MONEY TO GET MORE.: NEVER TRUE

## 2021-03-15 ASSESSMENT — PATIENT HEALTH QUESTIONNAIRE - PHQ9
SUM OF ALL RESPONSES TO PHQ9 QUESTIONS 1 & 2: 0
2. FEELING DOWN, DEPRESSED OR HOPELESS: 0

## 2021-03-15 ASSESSMENT — ENCOUNTER SYMPTOMS
EYE DISCHARGE: 0
EYE ITCHING: 0
BACK PAIN: 0
EYE PAIN: 0
APNEA: 0
ABDOMINAL DISTENTION: 0
PHOTOPHOBIA: 0
EYE REDNESS: 0

## 2021-03-15 NOTE — PATIENT INSTRUCTIONS
your [employer] account. Enter N639 in the [facility] box to learn more about \"Hand Arthritis: Exercises. \"     If you do not have an account, please click on the \"Sign Up Now\" link. Current as of: November 16, 2020               Content Version: 12.8  © 8796-2549 [employer]. Care instructions adapted under license by [facility] ([facility]). If you have questions about a medical condition or this instruction, always ask your healthcare professional. [address] any warranty or liability for your use of this information.

## 2021-03-15 NOTE — PROGRESS NOTES
is normal. Judgment and thought content normal.       A/P:    Bilateral polyarthritis of PIPs -- concern for possible RA, check inflammatory markers, counseled NSAID trial, x-rays hands     Elevated blood pressure -- on follow visit if again above goal recommend treatment initiation    Chronic lumbar pain -- check L-spine xray, no red flag symptoms on exam    Opioid use disorder -- active inhalation; offerred recovery resources and he defers for now; screen for HIV, HCV; counseled about risks of ongoing abuse    Hx of alcohol dependence -- endorses sobriety     Tobacco abuse counseling -- pre contemplative with 30 pack yr hx    Remainder of medical problems as per resident note.     Sharon Corrigan DO  3/15/2021 10:05 AM

## 2021-03-15 NOTE — PROGRESS NOTES
[facility] Internal Medicine      SUBJECTIVE:  Overton Baumgarten (:  1970) is a 52 y.o. male here for evaluation of the following chief complaint(s):  New Patient, Facial Swelling (right side), Arthritis (hands and feet), and Back Pain (lower back ongoing pain)  PMH significant for alcohol abuse, IV drug use, tobacco use, chronic back pain. Patient presented to us to establish care. He followed with Dr. Roseline Garvey in [address] [place] [address] but has not had a PCP since. Recent history of right side facial swelling 2/2 tooth infection s/p extraction and 7 day antibiotic course. Patient was also prescribed Medrol pack but he did not take any. C/o residual swelling on R side. No associated fever, chills, headache, post nasal drip, rhinorrhea, pain, discharge, erythema, changes in vision or hearing noted. Patient also stated that he has been having b/l hand pain, swelling since the past year. He state she has tingling feeling in his hands. Also advertises stiffness in the morning that improves throughout the day and tingling at night. Patient admits to hx of alcohol abuse, he states he quit 10 years ago. Used to drink atleast a pint of whiskey daily. Currently admits to Heroin use, states he snorts it - buys 0.5 g (lasts 2-3 days - uses daily). Smokes 1 PPD of cigarettes since he was 18. Patient was counseled about drug and tobacco use - he state she would like to think about it. Consultation and discussed with counselor Ms Sohan Caraballo was also discussed, pt differed it to next visit. Family history- Father passed of cancer unknown to patient. Mother passed of throat cancer. Brother is noted to have hypertension and sister has hypoxic respiratory failure, on home O2 (unknown diagnosis)    Patient is . He has 1 daughter (29 y/o), he currently lives with his brother. Review of Systems   Constitutional: Negative for activity change. HENT: Negative for congestion.     Eyes: Negative for photophobia, pain, discharge, redness, itching and visual disturbance. Respiratory: Negative for apnea. Cardiovascular: Negative for chest pain. Gastrointestinal: Negative for abdominal distention. Endocrine: Negative for cold intolerance. Genitourinary: Negative for difficulty urinating and dysuria. Musculoskeletal: Positive for joint swelling. Negative for arthralgias and back pain. Neurological: Negative for dizziness and facial asymmetry. Hematological: Negative for adenopathy. Psychiatric/Behavioral: Negative for agitation. All other systems reviewed and are negative. Current Outpatient Medications on File Prior to Visit   Medication Sig Dispense Refill    ACETAMINOPHEN EXTRA STRENGTH 500 MG tablet take 1 tablet by mouth every 6 hours if needed for pain      methylPREDNISolone (MEDROL DOSEPACK) 4 MG tablet Take 4 mg by mouth daily       No current facility-administered medications on file prior to visit. OBJECTIVE:    VS:   Vitals:    03/15/21 0912   BP: (!) 157/91   Site: Left Upper Arm   Position: Sitting   Cuff Size: Medium Adult   Pulse: 72   Resp: 18   Temp: 97.1 °F (36.2 °C)   TempSrc: Oral   Weight: 254 lb 3.2 oz (115.3 kg)   Height: 5' 9\" (1.753 m)     Physical Exam  Vitals signs and nursing note reviewed. Constitutional:       Appearance: Normal appearance. HENT:      Head: Normocephalic and atraumatic. Mouth/Throat:      Mouth: Mucous membranes are moist.      Dentition: Dental caries present. No gingival swelling, dental abscesses or gum lesions. Cardiovascular:      Rate and Rhythm: Normal rate and regular rhythm. Pulmonary:      Effort: Pulmonary effort is normal.      Breath sounds: Normal breath sounds. Abdominal:      General: Abdomen is flat. There is no distension. Palpations: Abdomen is soft. Neurological:      Mental Status: He is alert. Health Maintenance     Diseases   1. HbA1c - 5.4% on 3/15/21  2.  HLD - lipid panel ordered 3/15/21  3. Obesity- central adiposity, wt- 254, BMI 37.54  4. Metabolic syndrome? - follow labs. 5. HIV - ordered 3/15  6. Hepatitis panel - ordered 3/15    Cancer Screening    1. Colon cancer screening - due 2023    Substance use   1. Tobacco use cessation - counseled - pt differed further intervention till next visit   2. Alcohol use - hx of alcohol abuse, quit 10 years ago   3. Illicit drug use- heroine, cocaine abuse. Hx of IV drug use. Mental health  1. Depression screening   2. Anxiety      Vaccines -pending  1. Influenza   2. Tdap/Td   3. Hepatitis B   4. Pneumovax (23) - due  5. PCV (13) - determine need   6. Shingles       ASSESSMENT/PLAN:  1. Pain in both hands  -     XR HAND LEFT (MIN 3 VIEWS); Future  -     XR HAND RIGHT (MIN 3 VIEWS); Future  2. Chronic left-sided low back pain, unspecified whether sciatica present  -     XR LUMBAR SPINE (2-3 VIEWS); Future  3. Need for hepatitis C screening test  -     HEPATITIS PANEL, ACUTE; Future  4. Asymptomatic HIV infection ([facility] [address]  5. Screening for human immunodeficiency virus  -     HIV-1 AND HIV-2 ANTIBODIES; Future  6. Anemia due to other cause, not classified  -     CBC WITH AUTO DIFFERENTIAL; Future  7. Screening for hyperlipidemia  -     LIPID PANEL; Future  8. Electrolyte abnormality  -     COMPREHENSIVE METABOLIC PANEL; Future  9. Screening for diabetes mellitus  -     POCT glycosylated hemoglobin (Hb A1C)  10. Nutrition deficiency due to a particular kind of food  -     VITAMIN B12 & FOLATE; Future  11. Vitamin D deficiency  -     Vitamin D 25 Hydroxy; Future       RTC:  Follow up in  1 month       I have reviewed my findings and recommendations with Minra Milian and Dr. Kika Dwyer.     Jessi Ledesma MD   3/15/2021

## 2021-03-15 NOTE — PROGRESS NOTES
Lab scripts and all instructions given to patient by dr Abhishek Robert appt scheduled and printed avs given to patient

## 2021-03-16 LAB
HAV IGM SER IA-ACNC: ABNORMAL
HEPATITIS B CORE IGM ANTIBODY: ABNORMAL
HEPATITIS B SURFACE ANTIGEN INTERPRETATION: ABNORMAL
HEPATITIS C ANTIBODY INTERPRETATION: REACTIVE
HIV-1 AND HIV-2 ANTIBODIES: NORMAL

## 2021-03-17 ENCOUNTER — TELEPHONE (OUTPATIENT)
Dept: INTERNAL MEDICINE | Age: 51
End: 2021-03-17

## 2021-03-17 DIAGNOSIS — R76.8 HEPATITIS C ANTIBODY POSITIVE IN BLOOD: Primary | ICD-10-CM

## 2021-03-17 NOTE — TELEPHONE ENCOUNTER
Patient called for results. Results of Xrays discussed. Patient was advised to take ibuprofen for pain as previously discussed. Blood work was positive for Hepatitis C antibody, Hep C viral load was put in. Patient was advised to get blood work done. Teeth extraction on 15 April. Follow up appointment with me on 21 April.      Electronically signed by Verona Fabry, MD on 3/17/2021 at 11:20 AM

## 2021-03-18 LAB — CYCLIC CITRULLINATED PEPTIDE ANTIBODY IGG: 2.8 U/ML (ref 0–7)

## 2021-04-15 ENCOUNTER — HOSPITAL ENCOUNTER (OUTPATIENT)
Age: 51
Discharge: HOME | End: 2021-04-15
Payer: COMMERCIAL

## 2021-04-15 DIAGNOSIS — R76.8 HEPATITIS C ANTIBODY POSITIVE IN BLOOD: ICD-10-CM

## 2021-04-15 PROCEDURE — 87522 HEPATITIS C REVRS TRNSCRPJ: CPT

## 2021-04-19 LAB
HCV QNT BY NAAT IU/ML: ABNORMAL
HCV QNT BY NAAT LOG IU/ML: 7.27 LOG IU/ML
INTERPRETATION: DETECTED

## 2021-04-23 ENCOUNTER — OFFICE VISIT (OUTPATIENT)
Dept: INTERNAL MEDICINE | Age: 51
End: 2021-04-23
Payer: COMMERCIAL

## 2021-04-23 ENCOUNTER — HOSPITAL ENCOUNTER (OUTPATIENT)
Age: 51
Discharge: HOME | End: 2021-04-23
Payer: COMMERCIAL

## 2021-04-23 VITALS
TEMPERATURE: 98 F | OXYGEN SATURATION: 98 % | HEART RATE: 66 BPM | HEIGHT: 72 IN | SYSTOLIC BLOOD PRESSURE: 135 MMHG | WEIGHT: 248.2 LBS | DIASTOLIC BLOOD PRESSURE: 83 MMHG | BODY MASS INDEX: 33.62 KG/M2 | RESPIRATION RATE: 20 BRPM

## 2021-04-23 DIAGNOSIS — M79.641 BILATERAL HAND PAIN: ICD-10-CM

## 2021-04-23 DIAGNOSIS — F11.10 OPIATE ABUSE, CONTINUOUS (HCC): ICD-10-CM

## 2021-04-23 DIAGNOSIS — B18.2 CHRONIC HEPATITIS C WITHOUT HEPATIC COMA (HCC): Primary | ICD-10-CM

## 2021-04-23 DIAGNOSIS — B18.2 CHRONIC HEPATITIS C WITHOUT HEPATIC COMA (HCC): ICD-10-CM

## 2021-04-23 DIAGNOSIS — Z23 NEED FOR PNEUMOCOCCAL VACCINATION: ICD-10-CM

## 2021-04-23 DIAGNOSIS — M79.642 BILATERAL HAND PAIN: ICD-10-CM

## 2021-04-23 LAB
BILIRUBIN URINE: NEGATIVE
BLOOD, URINE: NEGATIVE
C3 COMPLEMENT: 111 MG/DL (ref 90–180)
C4 COMPLEMENT: 12 MG/DL (ref 10–40)
CLARITY: CLEAR
COLOR: YELLOW
GLUCOSE URINE: NEGATIVE MG/DL
KETONES, URINE: NEGATIVE MG/DL
LEUKOCYTE ESTERASE, URINE: NEGATIVE
NITRITE, URINE: NEGATIVE
PH UA: 6.5 (ref 5–9)
PROTEIN UA: NEGATIVE MG/DL
SPECIFIC GRAVITY UA: 1.02 (ref 1–1.03)
UROBILINOGEN, URINE: 0.2 E.U./DL

## 2021-04-23 PROCEDURE — 90732 PPSV23 VACC 2 YRS+ SUBQ/IM: CPT

## 2021-04-23 PROCEDURE — G0009 ADMIN PNEUMOCOCCAL VACCINE: HCPCS

## 2021-04-23 PROCEDURE — G8417 CALC BMI ABV UP PARAM F/U: HCPCS | Performed by: INTERNAL MEDICINE

## 2021-04-23 PROCEDURE — 84165 PROTEIN E-PHORESIS SERUM: CPT

## 2021-04-23 PROCEDURE — 4004F PT TOBACCO SCREEN RCVD TLK: CPT | Performed by: INTERNAL MEDICINE

## 2021-04-23 PROCEDURE — 81003 URINALYSIS AUTO W/O SCOPE: CPT

## 2021-04-23 PROCEDURE — 6360000002 HC RX W HCPCS

## 2021-04-23 PROCEDURE — G8427 DOCREV CUR MEDS BY ELIG CLIN: HCPCS | Performed by: INTERNAL MEDICINE

## 2021-04-23 PROCEDURE — 99212 OFFICE O/P EST SF 10 MIN: CPT | Performed by: INTERNAL MEDICINE

## 2021-04-23 PROCEDURE — 36415 COLL VENOUS BLD VENIPUNCTURE: CPT

## 2021-04-23 PROCEDURE — 86038 ANTINUCLEAR ANTIBODIES: CPT

## 2021-04-23 PROCEDURE — 86160 COMPLEMENT ANTIGEN: CPT

## 2021-04-23 PROCEDURE — 99213 OFFICE O/P EST LOW 20 MIN: CPT | Performed by: INTERNAL MEDICINE

## 2021-04-23 RX ORDER — AMOXICILLIN 500 MG/1
500 TABLET, FILM COATED ORAL 2 TIMES DAILY
COMMUNITY
Start: 2021-04-15 | End: 2021-07-07

## 2021-04-23 ASSESSMENT — ENCOUNTER SYMPTOMS
EYE REDNESS: 0
BACK PAIN: 0
APNEA: 0
ABDOMINAL DISTENTION: 0
EYE DISCHARGE: 0
EYE ITCHING: 0
PHOTOPHOBIA: 0
EYE PAIN: 0

## 2021-04-23 NOTE — PROGRESS NOTES
Patient given instruction by Dr Hedy Horn. 6 month  follow up scheduled. Printed AVS given to patient. Pneumovax vac given without difficulty.

## 2021-04-23 NOTE — PATIENT INSTRUCTIONS
-Thank you for coming for your appointment.    -Please take your medications as prescribed. -Please have your Labs done before next follow up appointment.     -Smoking - We highly recommend you consider quitting smoking, it is associated with a number of cancers, heart diseases, lung diseases and strokes. - Please consider quitting drug use. If you require any help, we would be happy to provide assistance as possible. Follow up in 3 months     -If a referral was placed on your behalf during your visit, you should expect to receive information about the date and time of your referral appointment within 7-10 days. If not, please call the nurse line at [phone]. Should you have further questions in regards to this visit, you can review your clinical note and after visit summary document on your [employer] account. Other questions can be directed to our nurse line at [phone].

## 2021-04-23 NOTE — PROGRESS NOTES
[facility] Internal Medicine      SUBJECTIVE:  Aspen Amato (:  1970) is a 52 y.o. male here for evaluation of the following chief complaint(s):  Joint Pain (complains of left hand pain in the joints increased pain) and 1 Month Follow-Up  PMH significant for alcohol abuse, IV drug use, tobacco use, chronic back pain. Patient stated that his joint pain has been getting worse. he has been having b/l hand pain, swelling since the past year. He state she has tingling feeling in his hands. Patient also complained of stiffness feet. also advertises stiffness worse in the morning that improves throughout the day and tingling at night. Lab work was ordered previous visit-hepatitis C antibody positive, viral load was noted to be 7.27 log IU/mL. HIV, CCP antibody IgG, rheumatoid factor, CRP ESR were all within normal limits. Patient counseled tobacco use and heroin use. Patient is not ready to quit at this time. He understands the concerns    Review of Systems   Constitutional: Negative for activity change. HENT: Negative for congestion. Eyes: Negative for photophobia, pain, discharge, redness, itching and visual disturbance. Respiratory: Negative for apnea. Cardiovascular: Negative for chest pain. Gastrointestinal: Negative for abdominal distention. Endocrine: Negative for cold intolerance. Genitourinary: Negative for difficulty urinating and dysuria. Musculoskeletal: Positive for joint swelling. Negative for arthralgias and back pain. Neurological: Negative for dizziness and facial asymmetry. Hematological: Negative for adenopathy. Psychiatric/Behavioral: Negative for agitation. All other systems reviewed and are negative.       Current Outpatient Medications on File Prior to Visit   Medication Sig Dispense Refill    Amoxicillin 500 MG TABS Take 500 mg by mouth 2 times daily      ACETAMINOPHEN EXTRA STRENGTH 500 MG tablet take 1 tablet by mouth every 6 hours if needed for pain      ibuprofen (ADVIL;MOTRIN) 800 MG tablet Take 1 tablet by mouth every 8 hours as needed for Pain 60 tablet 0     No current facility-administered medications on file prior to visit. OBJECTIVE:    VS:   Vitals:    04/23/21 1444 04/23/21 1448   BP: (!) 141/85 135/83   Site: Left Upper Arm Right Upper Arm   Position: Sitting Sitting   Cuff Size: Medium Adult Medium Adult   Pulse: 72 66   Resp: 20    Temp: 98 °F (36.7 °C)    TempSrc: Oral    SpO2: 98%    Weight: 248 lb 3.2 oz (112.6 kg)    Height: 6' (1.829 m)      Physical Exam  Vitals signs and nursing note reviewed. Constitutional:       Appearance: Normal appearance. HENT:      Head: Normocephalic and atraumatic. Mouth/Throat:      Mouth: Mucous membranes are moist.      Dentition: Dental caries present. No gingival swelling, dental abscesses or gum lesions. Cardiovascular:      Rate and Rhythm: Normal rate and regular rhythm. Pulmonary:      Effort: Pulmonary effort is normal.      Breath sounds: Normal breath sounds. Abdominal:      General: Abdomen is flat. There is no distension. Palpations: Abdomen is soft. Neurological:      Mental Status: He is alert. Family history  Father passed of cancer unknown to patient. Mother passed of throat cancer. Brother is noted to have hypertension and sister has hypoxic respiratory failure, on home O2 (unknown diagnosis)    Social history  Patient is . He has 1 daughter (31 y/o), he currently lives with his brother. Health Maintenance   Diseases   1. HbA1c - 5.4% on 3/15/21  2. HLD - lipid panel ordered 3/15/21  3. Obesity- central adiposity, wt- 254, BMI 37.54  4. Metabolic syndrome? - follow labs. 5. HIV - ordered 3/15  6. Hepatitis panel - ordered 3/15    Cancer Screening    1. Colon cancer screening - due 2023    Substance use   1. Tobacco use cessation - 1 PPD of cigarettes since he was 19.  counseled - pt differed further

## 2021-04-25 PROBLEM — F11.10 OPIATE ABUSE, CONTINUOUS (HCC): Status: ACTIVE | Noted: 2021-04-25

## 2021-04-25 PROBLEM — B18.2 CHRONIC HEPATITIS C WITHOUT HEPATIC COMA (HCC): Status: ACTIVE | Noted: 2021-04-25

## 2021-04-26 LAB
ALBUMIN SERPL-MCNC: 3.7 G/DL (ref 3.5–4.7)
ALPHA-1-GLOBULIN: 0.3 G/DL (ref 0.2–0.4)
ALPHA-2-GLOBULIN: 0.7 G/FL (ref 0.5–1)
ANTI-NUCLEAR ANTIBODY (ANA): NEGATIVE
BETA GLOBULIN: 1.1 G/DL (ref 0.8–1.3)
ELECTROPHORESIS: ABNORMAL
GAMMA GLOBULIN: 1.8 G/DL (ref 0.7–1.6)
TOTAL PROTEIN: 7.6 G/DL (ref 6.4–8.3)

## 2021-07-07 ENCOUNTER — OFFICE VISIT (OUTPATIENT)
Dept: INTERNAL MEDICINE | Age: 51
End: 2021-07-07
Payer: COMMERCIAL

## 2021-07-07 ENCOUNTER — CLINICAL DOCUMENTATION (OUTPATIENT)
Dept: INTERNAL MEDICINE | Age: 51
End: 2021-07-07

## 2021-07-07 VITALS
HEIGHT: 69 IN | RESPIRATION RATE: 18 BRPM | DIASTOLIC BLOOD PRESSURE: 82 MMHG | BODY MASS INDEX: 35.95 KG/M2 | OXYGEN SATURATION: 99 % | HEART RATE: 56 BPM | TEMPERATURE: 97.9 F | WEIGHT: 242.7 LBS | SYSTOLIC BLOOD PRESSURE: 123 MMHG

## 2021-07-07 DIAGNOSIS — F11.90 OPIOID USE: ICD-10-CM

## 2021-07-07 DIAGNOSIS — B18.2 CHRONIC HEPATITIS C WITHOUT HEPATIC COMA (HCC): ICD-10-CM

## 2021-07-07 DIAGNOSIS — M13.0 POLYARTHRITIS: ICD-10-CM

## 2021-07-07 DIAGNOSIS — Z13.1 SCREENING FOR DIABETES MELLITUS: Primary | ICD-10-CM

## 2021-07-07 PROCEDURE — 4004F PT TOBACCO SCREEN RCVD TLK: CPT | Performed by: STUDENT IN AN ORGANIZED HEALTH CARE EDUCATION/TRAINING PROGRAM

## 2021-07-07 PROCEDURE — G8427 DOCREV CUR MEDS BY ELIG CLIN: HCPCS | Performed by: STUDENT IN AN ORGANIZED HEALTH CARE EDUCATION/TRAINING PROGRAM

## 2021-07-07 PROCEDURE — 99213 OFFICE O/P EST LOW 20 MIN: CPT | Performed by: STUDENT IN AN ORGANIZED HEALTH CARE EDUCATION/TRAINING PROGRAM

## 2021-07-07 PROCEDURE — 99214 OFFICE O/P EST MOD 30 MIN: CPT | Performed by: STUDENT IN AN ORGANIZED HEALTH CARE EDUCATION/TRAINING PROGRAM

## 2021-07-07 PROCEDURE — G8417 CALC BMI ABV UP PARAM F/U: HCPCS | Performed by: STUDENT IN AN ORGANIZED HEALTH CARE EDUCATION/TRAINING PROGRAM

## 2021-07-07 RX ORDER — IBUPROFEN 600 MG/1
TABLET ORAL
COMMUNITY
Start: 2021-05-06 | End: 2021-07-07

## 2021-07-07 ASSESSMENT — ENCOUNTER SYMPTOMS
DIARRHEA: 0
SHORTNESS OF BREATH: 0
NAUSEA: 0
SINUS PAIN: 0
CONSTIPATION: 0
SORE THROAT: 0
COUGH: 0
BACK PAIN: 0
WHEEZING: 0
VOMITING: 0
BLOOD IN STOOL: 0
ABDOMINAL PAIN: 0

## 2021-07-07 NOTE — PATIENT INSTRUCTIONS
Referral was done to pain management. Patient was done for physical and occupational therapy. Please do your blood work before your next visit. Please follow-up with internal medicine clinic in 3 months.

## 2021-07-07 NOTE — PROGRESS NOTES
[facility]  Internal Medicine Residency Clinic    Attending Physician Statement  I have discussed the case, including pertinent history and exam findings with the resident physician. I agree with the assessment, plan and orders as documented by the resident. I have reviewed all pertinent PMHx, PSHx, FamHx, SocialHx, medications, and allergies and updated history as appropriate. Patient here for routine follow up of medical problems. 1. Chronic Hep C, now following with Dr Candelaria Reyes. Will request for records. 2. Polyarthralgia (MCP, PIP, knees). Inflammatory markers and imaging are negative. May be related to work and his weight. Will do PT/OT, topical nsaid. 3. Continue use of inhalational opioids - will refer for outpatient cessation services  4. Tobacco use, encouraged cessation    Remainder of medical problems as per resident note. Roger Regan MD  7/7/2021 8:32 AM

## 2021-07-07 NOTE — PROGRESS NOTES
[facility] Internal Medicine      SUBJECTIVE:  Bronwyn Rock (:  1970) is a 52 y.o. male here for evaluation of the following chief complaint(s):  Vickey Duane is a 33-year-old male who is presenting today to the clinic for follow-up visit. He stated that over the last year is been complaining of polyarthralgias, primarily in the bilateral MTP and PIP joints (left more than right), and bilateral knee left more than right). His pain is worse at the beginning of the day, reported morning stiffness (not sure how long). He has been working as a  for a long period of time. He denies being diagnosed with any neurologic disease in the past or any significant allergic disease in his family. [facility] Rebecca Gregg unfortunately has been snoring opioids to achieve pain control. He has a history of IV drug use (last time was 3 to 4 years ago). Other review of systems unremarkable. Review of Systems   Constitutional: Negative for activity change, appetite change, chills, fatigue, fever and unexpected weight change. HENT: Negative for sinus pain and sore throat. Respiratory: Negative for cough, shortness of breath and wheezing. Cardiovascular: Negative for chest pain and leg swelling. Gastrointestinal: Negative for abdominal pain, blood in stool, constipation, diarrhea, nausea and vomiting. Endocrine: Negative for polydipsia, polyphagia and polyuria. Genitourinary: Negative for difficulty urinating, dysuria, flank pain and hematuria. Musculoskeletal: Positive for arthralgias. Negative for back pain. Skin: Negative for rash. Neurological: Negative for numbness. Psychiatric/Behavioral: Negative.         Current Outpatient Medications on File Prior to Visit   Medication Sig Dispense Refill    ACETAMINOPHEN EXTRA STRENGTH 500 MG tablet take 1 tablet by mouth every 6 hours if needed for pain       No current facility-administered medications on file prior to visit. OBJECTIVE:    VS:   Vitals:    07/07/21 0806   BP: 123/82   Site: Right Upper Arm   Position: Sitting   Cuff Size: Medium Adult   Pulse: 56   Resp: 18   Temp: 97.9 °F (36.6 °C)   TempSrc: Oral   SpO2: 99%   Weight: 242 lb 11.2 oz (110.1 kg)   Height: 5' 9\" (1.753 m)     Physical Exam  Constitutional:       Appearance: He is well-developed. HENT:      Head: Normocephalic and atraumatic. Eyes:      Pupils: Pupils are equal, round, and reactive to light. Neck:      Thyroid: No thyromegaly. Vascular: No JVD. Cardiovascular:      Rate and Rhythm: Normal rate and regular rhythm. Heart sounds: Normal heart sounds. No murmur heard. No friction rub. No gallop. Pulmonary:      Effort: No respiratory distress. Breath sounds: No stridor. No wheezing or rales. Chest:      Chest wall: No tenderness. Abdominal:      General: There is no distension. Palpations: Abdomen is soft. There is no mass. Tenderness: There is no abdominal tenderness. There is no guarding or rebound. Musculoskeletal:         General: No swelling, tenderness or deformity. Lymphadenopathy:      Cervical: No cervical adenopathy. Skin:     General: Skin is dry. Neurological:      Mental Status: He is alert and oriented to person, place, and time. Sensory: No sensory deficit. Deep Tendon Reflexes: Reflexes normal.   Psychiatric:         Behavior: Behavior normal.            ASSESSMENT/PLAN:  1. Screening for diabetes mellitus  2. Chronic hepatitis C without hepatic coma (HCC)  -     diclofenac sodium (VOLTAREN) 1 % GEL; Apply topically 2 times daily, Topical, 2 TIMES DAILY Starting Wed 7/7/2021, Disp-50 g, R-1, Normal  3. Polyarthritis  -     [facility] Pain [facility], [address] [place] [address], [address]  -     TSH; Future  -     Cyclic Citrul Peptide Antibody, IgG; Future  4. Opioid use  -     [id number] [facility] Clinics ONLY     Polyarthralgia  Reported morning stiffness, for the last year. Nontender on physical examination, no swelling. ANU negative, rheumatoid factor negative, inflammatory markers negative. Work-up for cryoglobulinemia associated with hepatitis C was unremarkable  We will get TSH and an anti-CCP  Suspecting osteoarthritis (working in construction working  Needs better pain control, referral was done to pain management (would likely be an issue because of his current drug use).  referral was done for your use  Referral was done to physical and occupational therapy. Opioid use  Referral was done for . Initiation of referral to [facility] was noted  Patient was counseled about side effects and potential risks including death. Hepatitis C  Following with gastroenterology  Will obtain records from gastroenterology. Tobacco abuse  Patient is not willing to quit at this point. Smoking a pack a day for the last 30 years    RTC:  Return in about 3 months (around 10/7/2021). I have reviewed my findings and recommendations with Theodora Daly and Dr. Jose Tadeo.     Ileana Graham MD   7/7/2021 1:45 PM

## 2021-07-07 NOTE — PROGRESS NOTES
LISW met with pt to complete assessment. Pt reports challenges with pain that has led to him using substances. Pt admits to heroin use intermittently that he snorts per pt report. Pt reports he used to drink alcohol but no longer drinks. 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 Pt reports he has not ever been involved in treatment and usually stops his use on his own. Pt is being referred to pain clinic for chronic pain. LISW discussed treatment options and pt reported he would be willing to speak with someone from [facility] for additional support.      SW initiated referral to [facility] ext [phone]

## 2021-10-04 ENCOUNTER — OFFICE VISIT (OUTPATIENT)
Dept: INTERNAL MEDICINE | Age: 51
End: 2021-10-04
Payer: COMMERCIAL

## 2021-10-04 ENCOUNTER — HOSPITAL ENCOUNTER (OUTPATIENT)
Age: 51
Discharge: HOME | End: 2021-10-04
Payer: COMMERCIAL

## 2021-10-04 VITALS
BODY MASS INDEX: 33.16 KG/M2 | WEIGHT: 244.8 LBS | SYSTOLIC BLOOD PRESSURE: 132 MMHG | DIASTOLIC BLOOD PRESSURE: 77 MMHG | HEIGHT: 72 IN | TEMPERATURE: 98.1 F | HEART RATE: 74 BPM | RESPIRATION RATE: 20 BRPM | OXYGEN SATURATION: 98 %

## 2021-10-04 DIAGNOSIS — Z23 NEED FOR INFLUENZA VACCINATION: ICD-10-CM

## 2021-10-04 DIAGNOSIS — M13.0 POLYARTHRITIS: ICD-10-CM

## 2021-10-04 DIAGNOSIS — B18.2 CHRONIC HEPATITIS C WITHOUT HEPATIC COMA (HCC): ICD-10-CM

## 2021-10-04 DIAGNOSIS — B18.2 CHRONIC HEPATITIS C WITHOUT HEPATIC COMA (HCC): Primary | ICD-10-CM

## 2021-10-04 LAB — TSH SERPL DL<=0.05 MIU/L-ACNC: 1.42 UIU/ML (ref 0.27–4.2)

## 2021-10-04 PROCEDURE — G0008 ADMIN INFLUENZA VIRUS VAC: HCPCS

## 2021-10-04 PROCEDURE — G8427 DOCREV CUR MEDS BY ELIG CLIN: HCPCS | Performed by: INTERNAL MEDICINE

## 2021-10-04 PROCEDURE — 36415 COLL VENOUS BLD VENIPUNCTURE: CPT

## 2021-10-04 PROCEDURE — 84443 ASSAY THYROID STIM HORMONE: CPT

## 2021-10-04 PROCEDURE — 99212 OFFICE O/P EST SF 10 MIN: CPT | Performed by: INTERNAL MEDICINE

## 2021-10-04 PROCEDURE — 90686 IIV4 VACC NO PRSV 0.5 ML IM: CPT

## 2021-10-04 PROCEDURE — 99213 OFFICE O/P EST LOW 20 MIN: CPT | Performed by: INTERNAL MEDICINE

## 2021-10-04 PROCEDURE — G8417 CALC BMI ABV UP PARAM F/U: HCPCS | Performed by: INTERNAL MEDICINE

## 2021-10-04 PROCEDURE — 86200 CCP ANTIBODY: CPT

## 2021-10-04 PROCEDURE — 4004F PT TOBACCO SCREEN RCVD TLK: CPT | Performed by: INTERNAL MEDICINE

## 2021-10-04 PROCEDURE — 6360000002 HC RX W HCPCS

## 2021-10-04 PROCEDURE — G8482 FLU IMMUNIZE ORDER/ADMIN: HCPCS | Performed by: INTERNAL MEDICINE

## 2021-10-04 RX ORDER — IBUPROFEN 600 MG/1
600 TABLET ORAL 3 TIMES DAILY PRN
COMMUNITY
Start: 2021-07-12 | End: 2021-12-14 | Stop reason: ALTCHOICE

## 2021-10-04 ASSESSMENT — ENCOUNTER SYMPTOMS
EYE ITCHING: 0
EYE REDNESS: 0
BACK PAIN: 0
APNEA: 0
PHOTOPHOBIA: 0
ABDOMINAL DISTENTION: 0
EYE PAIN: 0
EYE DISCHARGE: 0

## 2021-10-04 NOTE — PATIENT INSTRUCTIONS
-Thank you for coming for your appointment.    -Please take your medications as prescribed. -Please have your Labs done before next follow up appointment. Please call [phone] to schedule your physical therapy appointment     Please call [phone] to schedule appointment with Dr. Coco Bianchi for your ultrasound follow up     -Smoking - We highly recommend you consider quitting smoking, it is associated with a number of cancers, heart diseases, lung diseases and strokes. -If a referral was placed on your behalf during your visit, you should expect to receive information about the date and time of your referral appointment within 7-10 days. If not, please call the nurse line at [phone]. Should you have further questions in regards to this visit, you can review your clinical note and after visit summary document on your [employer] account. Other questions can be directed to our nurse line at [phone].

## 2021-10-04 NOTE — PROGRESS NOTES
[facility] Internal Medicine      SUBJECTIVE:  Ruth Marks (:  1970) is a 52 y.o. male here for evaluation of the following chief complaint(s): Other (2 month follow up) and Hepatitis C  PMH significant for alcohol abuse, IV drug use, tobacco use, chronic back pain. Patient stated that his joint pain has been getting worse. He has been having b/l hand pain, swelling since the past year. He states he has tingling feeling in his hands. Patient also complained of stiffness feet. Advertises stiffness worse in the morning that improves throughout the day and tingling at night. Lab work was ordered previous visit-hepatitis C antibody positive, viral load was noted to be 7.27 log IU/mL. HIV, CCP antibody IgG, rheumatoid factor, CRP ESR were all within normal limits. Patient was given a PT referral, has not followed up. Advised to call and make an appointment. Pt got US liver per Dr. Deshawn Paez, has not followed up with him since. Gave him the office number to make a follow up appointment. Patient counseled tobacco use and heroin use. Patient is not ready to quit at this time. He understands the complications and problems linked to substance use     Review of Systems   Constitutional: Negative for activity change. HENT: Negative for congestion. Eyes: Negative for photophobia, pain, discharge, redness, itching and visual disturbance. Respiratory: Negative for apnea. Cardiovascular: Negative for chest pain. Gastrointestinal: Negative for abdominal distention. Endocrine: Negative for cold intolerance. Genitourinary: Negative for difficulty urinating and dysuria. Musculoskeletal: Positive for joint swelling. Negative for arthralgias and back pain. Neurological: Negative for dizziness and facial asymmetry. Hematological: Negative for adenopathy. Psychiatric/Behavioral: Negative for agitation.    All other systems reviewed and are negative. Current Outpatient Medications on File Prior to Visit   Medication Sig Dispense Refill    diclofenac sodium (VOLTAREN) 1 % GEL Apply topically 2 times daily 50 g 1    ACETAMINOPHEN EXTRA STRENGTH 500 MG tablet take 1 tablet by mouth every 6 hours if needed for pain       No current facility-administered medications on file prior to visit. OBJECTIVE:    VS:   Vitals:    10/04/21 1322   BP: 132/77   Site: Right Upper Arm   Position: Sitting   Cuff Size: Large Adult   Pulse: 74   Resp: 20   Temp: 98.1 °F (36.7 °C)   TempSrc: Oral   SpO2: 98%   Weight: 244 lb 12.8 oz (111 kg)   Height: 6' (1.829 m)     Physical Exam  Vitals and nursing note reviewed. Constitutional:       Appearance: Normal appearance. HENT:      Head: Normocephalic and atraumatic. Mouth/Throat:      Mouth: Mucous membranes are moist.      Dentition: Dental caries present. No gingival swelling, dental abscesses or gum lesions. Cardiovascular:      Rate and Rhythm: Normal rate and regular rhythm. Pulmonary:      Effort: Pulmonary effort is normal.      Breath sounds: Normal breath sounds. Abdominal:      General: Abdomen is flat. There is no distension. Palpations: Abdomen is soft. Neurological:      Mental Status: He is alert. Family history  Father passed of cancer unknown to patient. Mother passed of throat cancer. Brother is noted to have hypertension and sister has hypoxic respiratory failure, on home O2 (unknown diagnosis)    Social history  Patient is . He has 1 daughter (29 y/o), he currently lives with his brother. Health Maintenance   Diseases   1. HbA1c - 5.4% on 3/15/21  2. HLD - lipid panel ordered 3/15/21  3. Obesity- central adiposity, wt- 254, BMI 37.54  4. Metabolic syndrome? - follow labs. 5. HIV - ordered 3/15  6. Hepatitis panel - ordered 3/15    Cancer Screening    1. Colon cancer screening - due 2023    Substance use   1.  Tobacco use cessation - 1 PPD of cigarettes

## 2021-10-04 NOTE — PROGRESS NOTES
Patient given instruction by Dr Karishma Barros. 1 month  follow up scheduled. Printed AVS given to patient. Influenza vaccine given. Tolerated well. No adverse reaction.

## 2021-10-08 LAB — CYCLIC CITRULLINATED PEPTIDE ANTIBODY IGG: 3.8 U/ML (ref 0–7)

## 2021-10-11 NOTE — PROGRESS NOTES
[facility]  Internal Medicine Residency    Internal Medicine     Attending Physician Statement  I have discussed the case, including pertinent history and exam findings with the resident and the team.  I have seen and examined the patient and the key elements of the encounter have been performed by me. I agree with the assessment, plan and orders as documented by the resident. Past, family, and social history were reviewed.     Princess Leidy MD MD

## 2021-11-16 ENCOUNTER — HOSPITAL ENCOUNTER (OUTPATIENT)
Dept: PHYSICAL THERAPY | Age: 51
Setting detail: THERAPIES SERIES
Discharge: HOME | End: 2021-11-16
Payer: COMMERCIAL

## 2021-11-16 PROCEDURE — 97161 PT EVAL LOW COMPLEX 20 MIN: CPT

## 2021-11-16 NOTE — PROGRESS NOTES
[facility] [place]                Phone: [phone]   Fax: [phone]    Physical Therapy Initial Evaluation  Date:  2021    Patient Name:  Freda Larios    :  1970  MRN: [id number]  Referring Physician:  Sheila Giordano Information:  Mateo Ribeiro MEDICAID     Evaluation date:  21  Diagnosis:  Polyarthritis   Precautions:  Heroin use per chart   ICD-10 Codes:  M13.0  Evaluating Physical Therapist:  Antonio Pal, PT, DPT       Subjective:  History/Onset of Symptoms:  Pt reports body wide aching and pain for the last 2 years. He reports that pain comes and goes and he has \"good and bad days\". Pt reports pain in bilateral knees, ankles, feet, hands, and low back. Pt reports that hands are his worst pain currently. Pt states that joints in hands feel stiff especially in the AM and then by the end of the work day. Pt is R hand dominant. Pain:  9/10 bilateral hands with pain worse in bilateral index fingers        Sensation:  Pt denies numbness and tingling to BUE     Previous methods of Treatment:  Pt reports that he was prescribed a pain relief gel (can't remember the name)     Additional Comments:  Pt works in construction       Objective:    BUE:  WFL throughout      Strength:  BUE grossly 5/5 with no weakness noted   Full finger opposition AROM on both hands   Mild TTP to bilateral PIP joints on index fingers   Mild ulnar drift noted in digits 2-4 on bilateral hands       Had long discussion with pt regarding current symptoms. Appears as though his pain is arthritic in nature. I educated him on ways to manage symptoms such as heat, AROM, and rest as needed. Pt reports that he uses his hands daily due to working in construction. I also discussed possible OT referral for pt due to hand pain but he is in agreement with just managing his symptoms on his own at this time. Pt with no skilled PT needs at this time. Summary/Assessment:    Pt with no skilled PT needs at this time       Time In:  0810  Time Out:  [address], DPT  [id number]    REC[facility] RESPONSE [place]  T: [phone]   F: [phone]     If you have any questions or concerns, please don't hesitate to call. Thank you for your referral.    Physician Signature:________________________________Date:__________________  By signing above, therapists plan is approved by physician. All patients under [employer]   must be signed by physician.

## 2021-12-14 ENCOUNTER — OFFICE VISIT (OUTPATIENT)
Dept: INTERNAL MEDICINE | Age: 51
End: 2021-12-14
Payer: COMMERCIAL

## 2021-12-14 ENCOUNTER — HOSPITAL ENCOUNTER (OUTPATIENT)
Age: 51
Discharge: HOME | End: 2021-12-14
Payer: COMMERCIAL

## 2021-12-14 VITALS
OXYGEN SATURATION: 97 % | HEIGHT: 72 IN | SYSTOLIC BLOOD PRESSURE: 137 MMHG | BODY MASS INDEX: 32.51 KG/M2 | RESPIRATION RATE: 18 BRPM | DIASTOLIC BLOOD PRESSURE: 88 MMHG | TEMPERATURE: 97.3 F | WEIGHT: 240 LBS | HEART RATE: 68 BPM

## 2021-12-14 DIAGNOSIS — Z92.29 HEPATITIS A AND HEPATITIS B VACCINE ADMINISTERED: Primary | ICD-10-CM

## 2021-12-14 DIAGNOSIS — B18.2 CHRONIC HEPATITIS C WITHOUT HEPATIC COMA (HCC): ICD-10-CM

## 2021-12-14 LAB
ALBUMIN SERPL-MCNC: 4 G/DL (ref 3.5–5.2)
ALP BLD-CCNC: 52 U/L (ref 40–129)
ALT SERPL-CCNC: 20 U/L (ref 0–40)
ANION GAP SERPL CALCULATED.3IONS-SCNC: 11 MMOL/L (ref 7–16)
AST SERPL-CCNC: 24 U/L (ref 0–39)
BILIRUB SERPL-MCNC: 0.4 MG/DL (ref 0–1.2)
BUN BLDV-MCNC: 10 MG/DL (ref 6–20)
CALCIUM SERPL-MCNC: 9.3 MG/DL (ref 8.6–10.2)
CHLORIDE BLD-SCNC: 100 MMOL/L (ref 98–107)
CO2: 26 MMOL/L (ref 22–29)
CREAT SERPL-MCNC: 0.6 MG/DL (ref 0.7–1.2)
GFR AFRICAN AMERICAN: >60
GFR NON-AFRICAN AMERICAN: >60 ML/MIN/1.73
GLUCOSE BLD-MCNC: 113 MG/DL (ref 74–99)
POTASSIUM SERPL-SCNC: 4.3 MMOL/L (ref 3.5–5)
SODIUM BLD-SCNC: 137 MMOL/L (ref 132–146)
TOTAL PROTEIN: 7.6 G/DL (ref 6.4–8.3)

## 2021-12-14 PROCEDURE — 36415 COLL VENOUS BLD VENIPUNCTURE: CPT

## 2021-12-14 PROCEDURE — 99214 OFFICE O/P EST MOD 30 MIN: CPT | Performed by: INTERNAL MEDICINE

## 2021-12-14 PROCEDURE — G8427 DOCREV CUR MEDS BY ELIG CLIN: HCPCS | Performed by: INTERNAL MEDICINE

## 2021-12-14 PROCEDURE — G0010 ADMIN HEPATITIS B VACCINE: HCPCS

## 2021-12-14 PROCEDURE — 80053 COMPREHEN METABOLIC PANEL: CPT

## 2021-12-14 PROCEDURE — 6360000002 HC RX W HCPCS

## 2021-12-14 PROCEDURE — 90632 HEPA VACCINE ADULT IM: CPT

## 2021-12-14 PROCEDURE — G8417 CALC BMI ABV UP PARAM F/U: HCPCS | Performed by: INTERNAL MEDICINE

## 2021-12-14 PROCEDURE — 4004F PT TOBACCO SCREEN RCVD TLK: CPT | Performed by: INTERNAL MEDICINE

## 2021-12-14 PROCEDURE — 90471 IMMUNIZATION ADMIN: CPT

## 2021-12-14 PROCEDURE — 90740 HEPB VACC 3 DOSE IMMUNSUP IM: CPT

## 2021-12-14 PROCEDURE — 82595 ASSAY OF CRYOGLOBULIN: CPT

## 2021-12-14 PROCEDURE — 99212 OFFICE O/P EST SF 10 MIN: CPT | Performed by: INTERNAL MEDICINE

## 2021-12-14 PROCEDURE — G8482 FLU IMMUNIZE ORDER/ADMIN: HCPCS | Performed by: INTERNAL MEDICINE

## 2021-12-14 PROCEDURE — 82105 ALPHA-FETOPROTEIN SERUM: CPT

## 2021-12-14 RX ORDER — IBUPROFEN 800 MG/1
TABLET ORAL
COMMUNITY
Start: 2021-11-21

## 2021-12-14 ASSESSMENT — ENCOUNTER SYMPTOMS
EYE DISCHARGE: 0
EYE ITCHING: 0
PHOTOPHOBIA: 0
EYE PAIN: 0
BACK PAIN: 0
EYE REDNESS: 0
APNEA: 0
ABDOMINAL DISTENTION: 0

## 2021-12-14 NOTE — PROGRESS NOTES
[facility] Internal Medicine      SUBJECTIVE:  Huey Ohara (:  1970) is a 54 y.o. male here for evaluation of the following chief complaint(s): Foot Swelling and Knee Pain  PMH significant for alcohol abuse, IV drug use, tobacco use, chronic back pain. Patient stated that his joint pain has been getting worse. He has been having pain and stiffness in feet and b/l hand swelling since the past year. He states he has tingling feeling in his hands. Stated stiffness worse in the morning that improves throughout the day and tingling at night previously but is constant now   Lab work was ordered previous visit-hepatitis C antibody positive, viral load was noted to be 7.27 log IU/mL. HIV, CCP antibody IgG, rheumatoid factor, CRP ESR were all within normal limits. Started  Treatment for hepatitis C last Saturday, for hepatitis - 1 pill a day, by Dr. Clara Salazar. Patient counseled tobacco use and heroin use. Patient is not ready to quit at this time. He understands the complications and problems linked to substance use     Review of Systems   Constitutional: Negative for activity change. HENT: Negative for congestion. Eyes: Negative for photophobia, pain, discharge, redness, itching and visual disturbance. Respiratory: Negative for apnea. Cardiovascular: Negative for chest pain. Gastrointestinal: Negative for abdominal distention. Endocrine: Negative for cold intolerance. Genitourinary: Negative for difficulty urinating and dysuria. Musculoskeletal: Positive for joint swelling. Negative for arthralgias and back pain. Neurological: Negative for dizziness and facial asymmetry. Hematological: Negative for adenopathy. Psychiatric/Behavioral: Negative for agitation. All other systems reviewed and are negative.       Current Outpatient Medications on File Prior to Visit   Medication Sig Dispense Refill    ibuprofen (ADVIL;MOTRIN) 800 MG tablet take 1 tablet by mouth every 6 hours if needed for pain      diclofenac sodium (VOLTAREN) 1 % GEL Apply topically 2 times daily (Patient not taking: Reported on 12/14/2021) 50 g 1     No current facility-administered medications on file prior to visit. OBJECTIVE:    VS:   Vitals:    12/14/21 0800   BP: 137/88   Site: Right Upper Arm   Position: Sitting   Cuff Size: Large Adult   Pulse: 68   Resp: 18   Temp: 97.3 °F (36.3 °C)   TempSrc: Temporal   SpO2: 97%   Weight: 240 lb (108.9 kg)   Height: 6' (1.829 m)     Physical Exam  Vitals and nursing note reviewed. Constitutional:       Appearance: Normal appearance. HENT:      Head: Normocephalic and atraumatic. Mouth/Throat:      Mouth: Mucous membranes are moist.      Dentition: Dental caries present. No gingival swelling, dental abscesses or gum lesions. Cardiovascular:      Rate and Rhythm: Normal rate and regular rhythm. Pulmonary:      Effort: Pulmonary effort is normal.      Breath sounds: Normal breath sounds. Abdominal:      General: Abdomen is flat. There is no distension. Palpations: Abdomen is soft. Neurological:      Mental Status: He is alert. Family history  Father passed of cancer unknown to patient. Mother passed of throat cancer. Brother is noted to have hypertension and sister has hypoxic respiratory failure, on home O2 (unknown diagnosis)    Social history  Patient is . He has 1 daughter (29 y/o), he currently lives with his brother. Health Maintenance   Diseases   1. HbA1c - 5.4% on 3/15/21  2. HLD - lipid panel ordered 3/15/21  3. Obesity- central adiposity, wt- 254, BMI 37.54  4. Metabolic syndrome? - follow labs. 5. HIV - ordered 3/15  6. Hepatitis panel - ordered 3/15    Cancer Screening    1. Colon cancer screening - due 2023    Substance use   1. Tobacco use cessation - 1 PPD of cigarettes since he was 19. counseled - pt differed further intervention till next visit   2.  Alcohol use - hx of alcohol abuse, quit 10 years ago   3. Illicit drug use- heroine, cocaine abuse. Currently admits to Heroin use, states he snorts it - buys 0.5 g (lasts 2-3 days - uses daily), last use this morning. Hx of IV drug use. Mental health  1. Depression screening   2. Anxiety      Vaccines -pending  1. Influenza- completed   2. Tdap/Td -8/24/2020-next due 2030  3. Hepatitis B- vaccine 1/3 - 12/14/21  4. Hepatitis A- 12/14/21  5. Pneumovax (23) - 4/23/21  6. PCV (13) - determine need   7. Shingles -not due for another 2 years  8. Covid vaccine-x2 February 2021, booster x1      ASSESSMENT/PLAN:    1. Arthropathy 2/2 Chronic hepatitis C?  -Anti CCP, RF factor, CRP, ESR, ANU neg, normal C3, C4. Labs consistent with HCV related arthritis, awaiting Cryoglobulin levels ordered.  -Complains of b/l hand pain, stiffness of feet, worse in the morning, improves throughout the day. Xray right had no erosive signs, left hand has mild degenerative changes or carpometacarpal joints & 2 MTP  -Started on treatment for Hep C by Dr. Keysha Vinson recently   -NSAIDs for pain meanwhile     2. Hep C infection, follow with Dr. Hira Carlos ordered by Dr. Keysha Vinson, completed by pt but has not followed up since. Need records. 3. Chronic back pain 2/2 degenerative changes in lumbar spine with endplate sclerosis & osteophytes     RTC:  Follow up in 3 month     I have reviewed my findings and recommendations with Isra Lima and Dr. Gustavo Gonzalez.     Cheyenne Lane MD   12/14/2021

## 2021-12-14 NOTE — PATIENT INSTRUCTIONS
Follow up in 3 months     We gave your hepatitis A and part 1of 3 hepatitis B vaccine    Please get your blood work done today     Try to keep your joints warm and covered.

## 2021-12-14 NOTE — PROGRESS NOTES
[facility]  Internal Medicine Residency Clinic    Attending Physician Statement  I have discussed the case, including pertinent history and exam findings with the resident physician. I agree with the assessment, plan and orders as documented by the resident. I have reviewed all pertinent PMHx, PSHx, FamHx, SocialHx, medications, and allergies and updated history as appropriate. Patient here for routine follow up of medical problems. Chronic Back Pain: symptomatic relief; RICE     Hepatitis C: currently undergoing treatment with GI; records request sent; AFP ordered as well as US liver which was performed by GI; records request sent     Chronic Opioid Use: patient not interested in quitting use of opioids or cocaine; discussed the detox as well as possible intervention with Addiction medicine but patient deferring at this time; patient understands that continued use places him at risk of Hepatitis C as well as HIV;     HCM: needs Hep A and Hep B vaccinations; 1st dose ordered; 1 month and 6 month followup appointments made for completion of vaccinations     Remainder of medical problems as per resident note.     [address] DO  12/14/2021 8:35 AM    Encounter time including independent chart review, discussion with patient, interpreting test results and/or external communications: 30'

## 2021-12-14 NOTE — PROGRESS NOTES
Patient has not knowingly had unprotected exposire to anyone positive for COVID-10 within the last 14 days DENIES    AND does not have the following signs or symptoms:    A. One of the followin. Fever greater than 100.0 F NEGATIVE       2. Cough NEGATIVE       3. New onset shortness of breath NEGATIVE       4. New onset difficulty breathing NEGATIVE    AND/OR    B. Two or more of the following criteria:        1. Muscle aches NEGATIVE       2. Headache NEGATIVE       3. Sore Throat NEGATIVE       4. New onset loss of smell or taste NEGATIVE       5. New onset diarrhea NEGATIVE       6. Chills NEGATIVE       7. Runny nose NEGATIVE       8.  Sneezing NEGATIVE    +++++++++++++++++++++++++++++++++++++  AVS and lab/xray orders printed and patient discharged by Dr. Mignon Bright

## 2021-12-18 LAB — AFP-TUMOR MARKER: 3 NG/ML (ref 0–9)

## 2021-12-20 LAB — CRYOGLOBULIN: NEGATIVE

## 2022-02-21 ENCOUNTER — OFFICE VISIT (OUTPATIENT)
Dept: INTERNAL MEDICINE | Age: 52
End: 2022-02-21
Payer: COMMERCIAL

## 2022-02-21 VITALS
DIASTOLIC BLOOD PRESSURE: 80 MMHG | TEMPERATURE: 97.7 F | OXYGEN SATURATION: 99 % | WEIGHT: 235 LBS | BODY MASS INDEX: 34.8 KG/M2 | HEIGHT: 69 IN | SYSTOLIC BLOOD PRESSURE: 135 MMHG | RESPIRATION RATE: 18 BRPM | HEART RATE: 78 BPM

## 2022-02-21 DIAGNOSIS — H53.9 VISION CHANGES: ICD-10-CM

## 2022-02-21 DIAGNOSIS — B19.20 HEPATITIS C VIRUS INFECTION WITHOUT HEPATIC COMA, UNSPECIFIED CHRONICITY: ICD-10-CM

## 2022-02-21 DIAGNOSIS — Z13.1 SCREENING FOR DIABETES MELLITUS: ICD-10-CM

## 2022-02-21 DIAGNOSIS — Z23 VACCINE FOR MEASLES: ICD-10-CM

## 2022-02-21 DIAGNOSIS — M19.90 ARTHRITIS: Primary | ICD-10-CM

## 2022-02-21 DIAGNOSIS — Z13.220 SCREENING FOR HYPERLIPIDEMIA: ICD-10-CM

## 2022-02-21 DIAGNOSIS — F19.10 SUBSTANCE ABUSE (HCC): ICD-10-CM

## 2022-02-21 PROCEDURE — G8417 CALC BMI ABV UP PARAM F/U: HCPCS | Performed by: INTERNAL MEDICINE

## 2022-02-21 PROCEDURE — G8482 FLU IMMUNIZE ORDER/ADMIN: HCPCS | Performed by: INTERNAL MEDICINE

## 2022-02-21 PROCEDURE — 4004F PT TOBACCO SCREEN RCVD TLK: CPT | Performed by: INTERNAL MEDICINE

## 2022-02-21 PROCEDURE — G8427 DOCREV CUR MEDS BY ELIG CLIN: HCPCS | Performed by: INTERNAL MEDICINE

## 2022-02-21 PROCEDURE — 99214 OFFICE O/P EST MOD 30 MIN: CPT | Performed by: INTERNAL MEDICINE

## 2022-02-21 PROCEDURE — 99212 OFFICE O/P EST SF 10 MIN: CPT | Performed by: INTERNAL MEDICINE

## 2022-02-21 ASSESSMENT — ENCOUNTER SYMPTOMS
BACK PAIN: 0
EYE REDNESS: 0
EYE DISCHARGE: 0
EYE PAIN: 0
PHOTOPHOBIA: 0
ABDOMINAL DISTENTION: 0
APNEA: 0
EYE ITCHING: 0

## 2022-02-21 NOTE — PATIENT INSTRUCTIONS
-Thank you for coming for your appointment.    -Please take your medications as prescribed. -Please have your Labs done before next follow up appointment. Please apply Voltaren to areas of pain      -If a referral was placed on your behalf during your visit, you should expect to receive information about the date and time of your referral appointment within 7-10 days. If not, please call the nurse line at [phone]. Should you have further questions in regards to this visit, you can review your clinical note and after visit summary document on your [employer] account. Other questions can be directed to our nurse line at [phone].

## 2022-02-21 NOTE — PROGRESS NOTES
AVS printed with follow up appointment and discharge instructions given per Dr. Rin Mcgowan. 2nd Hep B vaccine given Left deltoid today and tolerated well, instructed to call with any issues.

## 2022-02-21 NOTE — PROGRESS NOTES
[facility] Internal Medicine      SUBJECTIVE:  Александр Norwood (:  1970) is a 54 y.o. male here for evaluation of the following chief complaint(s):  Follow-up (states right foot still swollen, states number 8 pain, denies numbness and tingling. Denies any injury)  PMH significant for alcohol abuse, IV drug use, tobacco use, chronic back pain. Patient stated that his joint pain has not improved. He has been having pain and stiffness in feet and b/l hand swelling since the past year. He states he has tingling feeling in his hands. Stated stiffness worse in the morning that improves throughout the day and tingling at night previously but has been constant  Lab work was ordered previous visit-hepatitis C antibody positive, viral load was noted to be 7.27 log IU/mL. HIV, CCP antibody IgG, C3, C4, cryoglobulin, rheumatoid factor, CRP, ESR, were all within normal limits. Started  Treatment for hepatitis C last Saturday, for hepatitis - 1 pill a day, by Dr. Pat Chavez. Patient counseled tobacco use and heroin use. Patient is ready to quit at this time. Refferal to Dr. Ladonna Thibodeaux given today     Complained of far sightedness, referral to ophthalmology given     Pt advised to get labs done before next appointment and after he completes his HepC treatment     Review of Systems   Constitutional: Negative for activity change. HENT: Negative for congestion. Eyes: Negative for photophobia, pain, discharge, redness, itching and visual disturbance. Respiratory: Negative for apnea. Cardiovascular: Negative for chest pain. Gastrointestinal: Negative for abdominal distention. Endocrine: Negative for cold intolerance. Genitourinary: Negative for difficulty urinating and dysuria. Musculoskeletal: Positive for joint swelling. Negative for arthralgias and back pain. Neurological: Negative for dizziness and facial asymmetry. Hematological: Negative for adenopathy. Psychiatric/Behavioral: Negative for agitation. All other systems reviewed and are negative. Current Outpatient Medications on File Prior to Visit   Medication Sig Dispense Refill    ibuprofen (ADVIL;MOTRIN) 800 MG tablet take 1 tablet by mouth every 6 hours if needed for pain (Patient not taking: Reported on 2/21/2022)      diclofenac sodium (VOLTAREN) 1 % GEL Apply topically 2 times daily (Patient not taking: Reported on 12/14/2021) 50 g 1     No current facility-administered medications on file prior to visit. OBJECTIVE:    VS:   Vitals:    02/21/22 0921 02/21/22 0925   BP: (!) 141/87 135/80   Site: Right Upper Arm Right Upper Arm   Position: Sitting Sitting   Cuff Size: Large Adult Large Adult   Pulse: 88 78   Resp: 18    Temp: 97.7 °F (36.5 °C)    TempSrc: Temporal    SpO2: 99%    Weight: 235 lb (106.6 kg)    Height: 5' 9\" (1.753 m)      Physical Exam  Vitals and nursing note reviewed. Constitutional:       Appearance: Normal appearance. HENT:      Head: Normocephalic and atraumatic. Mouth/Throat:      Mouth: Mucous membranes are moist.      Dentition: Dental caries present. No gingival swelling, dental abscesses or gum lesions. Cardiovascular:      Rate and Rhythm: Normal rate and regular rhythm. Pulmonary:      Effort: Pulmonary effort is normal.      Breath sounds: Normal breath sounds. Abdominal:      General: Abdomen is flat. There is no distension. Palpations: Abdomen is soft. Neurological:      Mental Status: He is alert. Family history  Father passed of cancer unknown to patient. Mother passed of throat cancer. Brother is noted to have hypertension and sister has hypoxic respiratory failure, on home O2 (unknown diagnosis)    Social history  Patient is . He has 1 daughter (31 y/o), he currently lives with his brother. Health Maintenance   Diseases   1. HbA1c - 5.4% on 3/15/21  2. HLD - lipid panel ordered 3/15/21  3.  Obesity- central adiposity, wt- 254, BMI 37.54  4. Metabolic syndrome? - follow labs. 5. HIV - 3/15/21- neg  6. Hepatitis panel - positive for HepC, on treatment     Cancer Screening    1. Colon cancer screening - due, referral given     Substance use   1. Tobacco use cessation - 1 PPD of cigarettes since he was 19. counseled - pt differed further intervention till next visit   2. Alcohol use - hx of alcohol abuse, quit 10 years ago   3. Illicit drug use- heroine, cocaine abuse. Currently admits to Heroin use, states he snorts it - buys 0.5 g (lasts 2-3 days - uses daily), last use this morning. Hx of IV drug use. Mental health  1. Depression screening   2. Anxiety      Vaccines -pending  1. Influenza- completed   2. Tdap/Td -8/24/2020-next due 2030  3. Hepatitis B- vaccine 1/3 - 12/14/21  4. Hepatitis A- 12/14/21  5. Pneumovax (23) - 4/23/21  6. PCV (13) - determine need   7. Shingles -not due for another 2 years  8. Covid vaccine-x2 February 2021, booster x1      ASSESSMENT/PLAN:    1. Arthropathy 2/2 Chronic hepatitis C? Vs inflammatory arthritis vs seronegative RF.  -Complains of b/l hand pain, stiffness of feet, worse in the morning, improves throughout the day. Xray right had no erosive signs, left hand has mild degenerative changes or carpometacarpal joints & 2 MTP  -Started on treatment for Hep C by Dr. Emani Nolan, course to end shortly    -NSAIDs for pain meanwhile   -consider starting anti-inflammatory meds next visit     2. Hep C infection, follow with Dr. Carlos Jones ordered by Dr. Emani Nolan, completed by pt but has not followed up since. Need records. 3. Chronic back pain 2/2 degenerative changes in lumbar spine with endplate sclerosis & osteophytes     RTC:  Follow up in 1 month     I have reviewed my findings and recommendations with Aleisha Lan and Dr. Fidel Sullivan.     Bel Melara MD   2/21/2022

## 2022-02-21 NOTE — PROGRESS NOTES
[facility]  Internal Medicine Residency Clinic    Attending Physician Statement  I have discussed the case, including pertinent history and exam findings with the resident physician. I agree with the assessment, plan and orders as documented by the resident. I have reviewed all pertinent PMHx, PSHx, FamHx, SocialHx, medications, and allergies and updated history as appropriate. Patient here for routine follow up of medical problems. Chronic Hepatitis C: treatment with Dr. Edilma Castle; need records; patient to finish treatment this month; repeat viral load and liver panel and US after completion of treatment     Arthritis: blood work ordered; patient likely has seronegative inflammatory arthritis 2/2 Hep C; after finishing treatment for hep c and normal LFTs plan to start patient on HCQ; continue OTC and topical pain relief     Opiate Abuse: patient interested in quitting; patient referred to Dr. Judith Gale for suboxone     HCM: blood work and vaccinations; patient needs MMR and will be referred to pharmacy for treatment     Remainder of medical problems as per resident note.     Micky He DO  2/21/2022 9:16 AM    Encounter time including independent chart review, discussion with patient, interpreting test results and/or external communications: 30'

## 2022-03-03 ENCOUNTER — HOSPITAL ENCOUNTER (OUTPATIENT)
Age: 52
End: 2022-03-03
Payer: COMMERCIAL

## 2022-03-03 ENCOUNTER — HOSPITAL ENCOUNTER (OUTPATIENT)
Age: 52
Discharge: HOME | End: 2022-03-03
Payer: COMMERCIAL

## 2022-03-03 ENCOUNTER — HOSPITAL ENCOUNTER (OUTPATIENT)
Age: 52
Discharge: HOME | End: 2022-03-05
Payer: COMMERCIAL

## 2022-03-03 ENCOUNTER — HOSPITAL ENCOUNTER (OUTPATIENT)
Dept: GENERAL RADIOLOGY | Age: 52
Discharge: HOME | End: 2022-03-05
Payer: COMMERCIAL

## 2022-03-03 DIAGNOSIS — R52 PAIN: ICD-10-CM

## 2022-03-03 LAB
ALBUMIN SERPL-MCNC: 4.3 G/DL (ref 3.5–5.2)
ALP BLD-CCNC: 59 U/L (ref 40–129)
ALT SERPL-CCNC: 16 U/L (ref 0–40)
ANION GAP SERPL CALCULATED.3IONS-SCNC: 12 MMOL/L (ref 7–16)
AST SERPL-CCNC: 22 U/L (ref 0–39)
BILIRUB SERPL-MCNC: <0.2 MG/DL (ref 0–1.2)
BUN BLDV-MCNC: 12 MG/DL (ref 6–20)
CALCIUM SERPL-MCNC: 9 MG/DL (ref 8.6–10.2)
CHLORIDE BLD-SCNC: 100 MMOL/L (ref 98–107)
CO2: 24 MMOL/L (ref 22–29)
CREAT SERPL-MCNC: 0.7 MG/DL (ref 0.7–1.2)
GFR AFRICAN AMERICAN: >60
GFR NON-AFRICAN AMERICAN: >60 ML/MIN/1.73
GLUCOSE BLD-MCNC: 99 MG/DL (ref 74–99)
HCT VFR BLD CALC: 43.5 % (ref 37–54)
HEMOGLOBIN: 14.9 G/DL (ref 12.5–16.5)
MCH RBC QN AUTO: 31 PG (ref 26–35)
MCHC RBC AUTO-ENTMCNC: 34.3 % (ref 32–34.5)
MCV RBC AUTO: 90.4 FL (ref 80–99.9)
PDW BLD-RTO: 12.5 FL (ref 11.5–15)
PLATELET # BLD: 211 E9/L (ref 130–450)
PMV BLD AUTO: 10.1 FL (ref 7–12)
POTASSIUM SERPL-SCNC: 4.3 MMOL/L (ref 3.5–5)
RBC # BLD: 4.81 E12/L (ref 3.8–5.8)
SEDIMENTATION RATE, ERYTHROCYTE: 12 MM/HR (ref 0–15)
SODIUM BLD-SCNC: 136 MMOL/L (ref 132–146)
TOTAL PROTEIN: 7.4 G/DL (ref 6.4–8.3)
URIC ACID, SERUM: 4.1 MG/DL (ref 3.4–7)
WBC # BLD: 6.3 E9/L (ref 4.5–11.5)

## 2022-03-03 PROCEDURE — 73630 X-RAY EXAM OF FOOT: CPT

## 2022-03-03 PROCEDURE — 85027 COMPLETE CBC AUTOMATED: CPT

## 2022-03-03 PROCEDURE — 80053 COMPREHEN METABOLIC PANEL: CPT

## 2022-03-03 PROCEDURE — 85651 RBC SED RATE NONAUTOMATED: CPT

## 2022-03-03 PROCEDURE — 36415 COLL VENOUS BLD VENIPUNCTURE: CPT

## 2022-03-03 PROCEDURE — 84550 ASSAY OF BLOOD/URIC ACID: CPT

## 2022-03-09 ENCOUNTER — HOSPITAL ENCOUNTER (OUTPATIENT)
Age: 52
Discharge: HOME | End: 2022-03-09
Payer: COMMERCIAL

## 2022-03-09 DIAGNOSIS — Z13.220 SCREENING FOR HYPERLIPIDEMIA: ICD-10-CM

## 2022-03-09 DIAGNOSIS — B19.20 HEPATITIS C VIRUS INFECTION WITHOUT HEPATIC COMA, UNSPECIFIED CHRONICITY: ICD-10-CM

## 2022-03-09 DIAGNOSIS — Z13.1 SCREENING FOR DIABETES MELLITUS: ICD-10-CM

## 2022-03-09 LAB
ALBUMIN SERPL-MCNC: 4.3 G/DL (ref 3.5–5.2)
ALP BLD-CCNC: 54 U/L (ref 40–129)
ALT SERPL-CCNC: 16 U/L (ref 0–40)
AST SERPL-CCNC: 23 U/L (ref 0–39)
BILIRUB SERPL-MCNC: 0.3 MG/DL (ref 0–1.2)
BILIRUBIN DIRECT: <0.2 MG/DL (ref 0–0.3)
BILIRUBIN, INDIRECT: NORMAL MG/DL (ref 0–1)
CHOLESTEROL, TOTAL: 187 MG/DL (ref 0–199)
HBA1C MFR BLD: 5.4 % (ref 4–5.6)
HDLC SERPL-MCNC: 39 MG/DL
LDL CHOLESTEROL CALCULATED: 135 MG/DL (ref 0–99)
TOTAL PROTEIN: 7.3 G/DL (ref 6.4–8.3)
TRIGL SERPL-MCNC: 65 MG/DL (ref 0–149)
VLDLC SERPL CALC-MCNC: 13 MG/DL

## 2022-03-09 PROCEDURE — 83036 HEMOGLOBIN GLYCOSYLATED A1C: CPT

## 2022-03-09 PROCEDURE — 36415 COLL VENOUS BLD VENIPUNCTURE: CPT

## 2022-03-09 PROCEDURE — 80076 HEPATIC FUNCTION PANEL: CPT

## 2022-03-09 PROCEDURE — 80061 LIPID PANEL: CPT

## 2022-03-28 NOTE — PROGRESS NOTES
[facility]  Internal Medicine Residency Program  ACC Note      SUBJECTIVE:  CC: had concerns including Foot Swelling (right swelling and pain) and Medication Refill. Corinne Ron presented to the [facility] for a routine visit  He stated that he is feeling well today. He reports pain in bilateral hand, feet, knee joints. He reports intermittent swelling of joint, not associated with any warmth, skin excoriation, not associated with any restriction of range of motion. He has been taking advil for pain but it has not been helping much. Review Of Systems:  General: no fevers, chills, weight loss or gain. Ears/Nose/Throat: no hearing loss, tinnitus, vertigo, nosebleed, nasal congestion, rhinorrhea, sore throat  Respiratory: no cough, pleuritic chest pain, dyspnea, or wheezing  Cardiovascular: no chest pain, angina, dyspnea on exertion, orthopnea, PND, palpitations, or claudication  Gastrointestinal: no nausea, vomiting, heartburn, diarrhea, constipation, abdominal pain, hematochezia or melena  Genitourinary: no urinary urgency, frequency, dysuria, nocturia, hesitancy, or incontinence  Musculoskeletal: no arthritis, arthralgia, myalgia, weakness, or morning stiffness  Skin: no abnormal pigmentation, rash, itching, masses, hair or nail changes    Outpatient Medications Marked as Taking for the 3/29/22 encounter (Office Visit) with Sky Muller MD   Medication Sig Dispense Refill    hydroCHLOROthiazide (MICROZIDE) 12.5 MG capsule Take 1 capsule by mouth every morning 90 capsule 1    meloxicam (MOBIC) 7.5 MG tablet Take 1 tablet by mouth daily 30 tablet 0    diclofenac sodium (VOLTAREN) 1 % GEL Apply 4 g topically 4 times daily as needed for Pain 50 g 2       I have reviewed all pertinent PMHx, PSHx, FamHx, SocialHx, medications, and allergies and updated history as appropriate.     OBJECTIVE:    VS: BP (!) 167/81 (Site: Left Upper Arm, Position: Sitting, Cuff Size: Medium Adult)   Pulse 60   Temp 97.9 °F (36.6 °C) (Temporal)   Resp 20   Ht 5' 9\" (1.753 m)   Wt 232 lb 11.2 oz (105.6 kg)   SpO2 98%   BMI 34.36 kg/m²   General appearance: Alert, Awake, Oriented times 3, no distress  Lungs: Lungs clear to auscultation bilaterally. No rhonchi, crackles or wheezes  Heart: S1 S2  Regular rate and rhythm. No rub, murmur or gallop  Abdomen: Abdomen soft, non-tender. non-distended BS normal. No masses, organomegaly, no guarding rebound or rigidity.   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 Micah Bermudez antivirals for HCV 2-3 weeks ago  Repeat labs and imaging to be done in first week of June and he will follow with      Opiate Abuse  Still uses heroin from the street  Referred to Dr. Lizette Peter for suboxone     Active smoker  He smokes about a PPD for the last 19 years  Not planning to quit yet     I have reviewed my findings and recommendations with Janis Hayes and Anupam Mallory MD PGY-3  3/29/2022 11:16 AM

## 2022-03-29 ENCOUNTER — OFFICE VISIT (OUTPATIENT)
Dept: INTERNAL MEDICINE | Age: 52
End: 2022-03-29
Payer: COMMERCIAL

## 2022-03-29 VITALS
RESPIRATION RATE: 20 BRPM | HEART RATE: 60 BPM | BODY MASS INDEX: 34.47 KG/M2 | WEIGHT: 232.7 LBS | OXYGEN SATURATION: 98 % | SYSTOLIC BLOOD PRESSURE: 167 MMHG | DIASTOLIC BLOOD PRESSURE: 81 MMHG | TEMPERATURE: 97.9 F | HEIGHT: 69 IN

## 2022-03-29 DIAGNOSIS — M19.90 OSTEOARTHRITIS, UNSPECIFIED OSTEOARTHRITIS TYPE, UNSPECIFIED SITE: Primary | ICD-10-CM

## 2022-03-29 DIAGNOSIS — I10 HYPERTENSION, UNSPECIFIED TYPE: ICD-10-CM

## 2022-03-29 PROCEDURE — 4004F PT TOBACCO SCREEN RCVD TLK: CPT | Performed by: INTERNAL MEDICINE

## 2022-03-29 PROCEDURE — G8427 DOCREV CUR MEDS BY ELIG CLIN: HCPCS | Performed by: INTERNAL MEDICINE

## 2022-03-29 PROCEDURE — G8482 FLU IMMUNIZE ORDER/ADMIN: HCPCS | Performed by: INTERNAL MEDICINE

## 2022-03-29 PROCEDURE — G8417 CALC BMI ABV UP PARAM F/U: HCPCS | Performed by: INTERNAL MEDICINE

## 2022-03-29 PROCEDURE — 99212 OFFICE O/P EST SF 10 MIN: CPT | Performed by: INTERNAL MEDICINE

## 2022-03-29 PROCEDURE — 99213 OFFICE O/P EST LOW 20 MIN: CPT | Performed by: INTERNAL MEDICINE

## 2022-03-29 RX ORDER — HYDROCHLOROTHIAZIDE 12.5 MG/1
12.5 CAPSULE, GELATIN COATED ORAL EVERY MORNING
Qty: 90 CAPSULE | Refills: 1 | Status: SHIPPED | OUTPATIENT
Start: 2022-03-29

## 2022-03-29 RX ORDER — MELOXICAM 7.5 MG/1
7.5 TABLET ORAL DAILY
Qty: 30 TABLET | Refills: 0 | Status: SHIPPED | OUTPATIENT
Start: 2022-03-29

## 2022-03-29 SDOH — ECONOMIC STABILITY: HOUSING INSECURITY
IN THE LAST 12 MONTHS, WAS THERE A TIME WHEN YOU DID NOT HAVE A STEADY PLACE TO SLEEP OR SLEPT IN A SHELTER (INCLUDING NOW)?: NO

## 2022-03-29 SDOH — ECONOMIC STABILITY: TRANSPORTATION INSECURITY
IN THE PAST 12 MONTHS, HAS THE LACK OF TRANSPORTATION KEPT YOU FROM MEDICAL APPOINTMENTS OR FROM GETTING MEDICATIONS?: NO

## 2022-03-29 SDOH — ECONOMIC STABILITY: INCOME INSECURITY: IN THE LAST 12 MONTHS, WAS THERE A TIME WHEN YOU WERE NOT ABLE TO PAY THE MORTGAGE OR RENT ON TIME?: NO

## 2022-03-29 SDOH — ECONOMIC STABILITY: FOOD INSECURITY: WITHIN THE PAST 12 MONTHS, THE FOOD YOU BOUGHT JUST DIDN'T LAST AND YOU DIDN'T HAVE MONEY TO GET MORE.: NEVER TRUE

## 2022-03-29 SDOH — ECONOMIC STABILITY: HOUSING INSECURITY: IN THE LAST 12 MONTHS, HOW MANY PLACES HAVE YOU LIVED?: 1

## 2022-03-29 SDOH — ECONOMIC STABILITY: FOOD INSECURITY: WITHIN THE PAST 12 MONTHS, YOU WORRIED THAT YOUR FOOD WOULD RUN OUT BEFORE YOU GOT MONEY TO BUY MORE.: NEVER TRUE

## 2022-03-29 ASSESSMENT — SOCIAL DETERMINANTS OF HEALTH (SDOH): HOW HARD IS IT FOR YOU TO PAY FOR THE VERY BASICS LIKE FOOD, HOUSING, MEDICAL CARE, AND HEATING?: NOT HARD AT ALL

## 2022-03-29 ASSESSMENT — LIFESTYLE VARIABLES: HOW OFTEN DO YOU HAVE A DRINK CONTAINING ALCOHOL: NEVER

## 2022-03-29 ASSESSMENT — PATIENT HEALTH QUESTIONNAIRE - PHQ9
SUM OF ALL RESPONSES TO PHQ9 QUESTIONS 1 & 2: 1
SUM OF ALL RESPONSES TO PHQ QUESTIONS 1-9: 1
2. FEELING DOWN, DEPRESSED OR HOPELESS: 1
1. LITTLE INTEREST OR PLEASURE IN DOING THINGS: 0
SUM OF ALL RESPONSES TO PHQ QUESTIONS 1-9: 1

## 2022-03-29 NOTE — PROGRESS NOTES
Patient given instruction by Dr Chiara Bo. 1 month  follow up scheduled. Printed AVS given to patient.

## 2022-03-29 NOTE — PROGRESS NOTES
[facility]  Internal Medicine Residency Clinic    Attending Physician Statement  I have discussed the case, including pertinent history and exam findings with the resident physician. I agree with the assessment, plan and orders as documented by the resident. I have reviewed all pertinent PMHx, PSHx, FamHx, SocialHx, medications, and allergies and updated history as appropriate. Of note, patient left prior to a full physical exam and complete evaluation could be completed. .     Patient presents for routine follow up of medical problems. Chronic Hepatitis C - recent completion of treatment with Dr. Jordon Rucker. Repeat labs in June    Continue management per GI.     OA - hand, low back and feet. Diffuse degenerative change on lumbar XR of 3/15/2021. 1st metatarsal degenerative change on L foot on 3/3/2022. L hand degenerative changes as well. Was referred to PT - patient states that he did not receive a phone call on this. Would be conservative otherwise in management - nothing surgical at this time. Could consider trial of Mobic for pain relief. Continue diclofenac gel to affected area. Opiate abuse - patient admits to continued use. Referral placed to [facility]. HTN - elevated today with variable blood pressures   Start HCTZ at 12.5 mg daily. Check labs in 2 weeks. Remainder of medical problems as per resident note.     Juventino Howell MD  3/29/2022 10:59 AM

## 2022-04-01 ENCOUNTER — TELEPHONE (OUTPATIENT)
Dept: INTERNAL MEDICINE | Age: 52
End: 2022-04-01

## 2022-04-01 NOTE — TELEPHONE ENCOUNTER
HERBERT followed up with pt to ensure pt was linked to services and pt is seeing Dr. Barbara Mejia next week for an assessment. SW provided contact information if pt needed additional support.

## 2022-04-07 ENCOUNTER — HOSPITAL ENCOUNTER (OUTPATIENT)
Dept: PSYCHIATRY | Age: 52
Setting detail: THERAPIES SERIES
Discharge: HOME | End: 2022-04-07

## 2022-04-07 NOTE — BH NOTE
CHIEF COMPLAINT:     HISTORY OF PRESENT ILLNESS:      Current Drug Use Including Type/Method of Ingestion/Frequency     Past Use  Opioids-       BNZ/Sedatives-  Cocaine-  Amphetamines-  THC-  Hallucinogens-  Alcohol-    DX Criteria for OUD    1) Opioids are often taken in larger amounts or over a longer period of time than intended. Y/N     2) There is a persistent desire or unsuccessful efforts to cut down or control opioid use. Y/N    3) A great deal of time is spent in activities necessary to obtain the opioid, use the opioid, or recover from its effects. Y/N    4) Craving, or a strong desire to use opioids. Y/N    5) Recurrent opioid use resulting in failure to fulfill major role obligations at work, school or home. Y/N    6) Continued opioid use despite having persistent or recurrent social or interpersonal problems caused or exacerbated by the effects of opioids. Y/N    7)  Important social, occupational or recreational activities are given up or reduced because of opioid use. Y/N    8) Recurrent opioid use in situations in which it is physically hazardous Y/N    9) Continued use despite knowledge of having a persistent or recurrent physical or psychological problem that is likely to have been caused or exacerbated by opioids. 10) Tolerance, as defined by either of the following: (a) a need for markedly increased amounts of opioids to achieve intoxication or desired effect (b) markedly diminished effect with continued use of the same amount of an opioid  *Withdrawal, as manifested by either of the following: (a) the characteristic opioid withdrawal syndrome (b) the same (or a closely related) substance are taken to relieve or avoid withdrawal symptoms        Total Number Boxes Checked Yes:  _________________  Severity: Mild: 2-3 symptoms. Moderate: 4-5 symptoms.   Severe: 6 or more symptoms       Past [facility] [address] [facility]  Pt/MAT-    Residential-    PAST PSYCHIATRIC HISTORY:      PAST MEDICAL HISTORY:   Chronic hepatitis C without hepatic coma (HCC)    Opiate abuse, continuous ([address]    Per 3/29/22 PGY-3 note     Hypertension  BP today 167/81 mmHg  BP has been elevated over the last few visits  Goal BP <130/80 mmHg  Last  (3/22)  Advised to check blood pressure regularly at home and maintain a blood pressure log  Will start on hydrochlorothiazide 12.5 mg daily  Will check BMP in 2 weeks  Follow up in 4 weeks     Arthritis of bilateral hand/feet/ low back  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 Asher Pillai antivirals for HCV 2-3 weeks ago  Repeat labs and imaging to be done in first week of June and he will follow with      Opiate Abuse  Still uses heroin from the street  Referred to Dr. Lorraine Hester for suboxone      Active smoker  He smokes about a PPD for the last 19 years  Not planning to quit yet     I have reviewed my findings and recommendations with Aubrie Carrera and Norma Ross MD PGY-3  3/29/2022 11:16 AM  MEDICAL ROS:       PAST SURGICAL HISTORY:    MEDICATIONS  Medications from outside sources need reconciliation.                 hydroCHLOROthiazide (MICROZIDE) 12.5 MG capsule Take 1 capsule by mouth every morning    meloxicam (MOBIC) 7.5 MG tablet Take 1 tablet by mouth daily    diclofenac sodium (VOLTAREN) 1 % GEL Apply 4 g topically 4 times daily as needed for Pain    ibuprofen (ADVIL;MOTRIN) 800 MG tablet take 1 tablet by mouth every 6 hours if needed for painPatient not taking: Reported on 2/21/2022       PDMP  000 no meds noted oast 2 years     ALLERGIES:      FAMILY ELISSA/ PSYCHIATRIC HISTORY:      SOCIAL HISTORY:   Developmental-  Maltreatment/Abuse HX-  Education-  Marital-  Children-  Work-  Legal-      COWS     Resting Pulse Rate: beats/minute Measured after patient is sitting or lying for one minute   0 pulse rate 80 or below    1 pulse rate    2 pulse rate 101-120    4 pulse rate greater than 120      GI Upset: over last 1/2  hour    0 no GI symptoms    1 stomach cramps    2 nausea or loose stool   3 vomiting or diarrhea    5 multiple episodes of diarrhea or vomiting      Sweating: over past 1/2  hour not accounted for by room temperature or patient activity.     0 no report of chills or flushing   1  subjective report of chills or flushing    2 flushed or observable moistness on  face     3 beads of sweat on brow or face    4 sweat streaming off face     Tremor observation of outstretched hands    0 no tremor    1 tremor can be felt, but not observed    2 slight tremor observable    4 gross tremor or muscle twitching     Restlessness Observation during assessment   0 able to sit still   1 reports difficulty sitting still, but is able to do so   3 frequent shifting or extraneous movements of legs/arms   5 unable to sit still for more than a few seconds     Yawning Observation during assessment    0 no yawning    1 yawning once or twice during assessment    2 yawning three or more times during assessment    4 yawning several times/minute     Pupil size   0 pupils pinned or normal size for room light    1  pupils possibly larger than normal for room light    2 pupils moderately dilated    5 pupils so dilated that only the rim of the iris is visible     Anxiety or Irritability   0 none    1 patient reports increasing irritability or anxiousness    2 patient obviously irritable or anxious    4 patient so irritable or anxious that participation in the assessment is difficult     Bone or Joint aches if patient was having pain previously, only the additional component attributed to opiates withdrawal is scored   0 not present   1  mild diffuse discomfort   2 patient reports severe diffuse aching of joints/muscles   4 patient is rubbing joints or muscles and is unable to sit still because of discomfort     Gooseflesh skin  0 skin is smooth   3 piloerrection of skin can be felt or hairs standing up on arms   5 prominent piloerrection     Runny nose or tearing Not accounted for by cold -symptoms or allergies   0 not present   1  nasal stuffiness or unusually moist eyes   2 nose running or tearing   4 nose constantly running or tears streaming down cheeks     Total Score The total score is the sum of all 11 items Initials of person completing assessment:   Score: 5-12 = mild;    13-24 = moderate;    25-36 = moderately severe;    more than 36 = severe withdrawal     LABS:     Other Tests:   Component Ref Range & Units 3/9/22 0817 3/3/22 1000 12/14/21 0937 4/23/21 1605 3/15/21 1224   Total Protein 6.4 - 8.3 g/dL 7.3  7.4  7.6  7.6  7.9    Albumin 3.5 - 5.2 g/dL 4.3  4.3  4.0  3.7 R  4.4    Alkaline Phosphatase 40 - 129 U/L 54  59  52   65    ALT 0 - 40 U/L 16  16  20   42 High     AST 0 - 39 U/L 23  22  24   31    Total Bilirubin 0.0 - 1.2 mg/dL 0.3  <0.2  0.4   0.3    Bilirubin, Direct 0.0 - 0.3 mg/dL <0.2        Bilirubin, Indirect 0.0 - 1.0 mg/dL see below          HCV QNT by NAAT IU/ML  [id number]    HCV Qnt by NAAT log IU/ml log IU/mL 7.27    Interpretation Not Detected Detected Abnormal           MENTAL STATUS EXAMINATION  Appearance:    Behavior:   Mood:   Affect:    Speech: Thought Process: Thought Content:    Perception:   Orientation:   Concentration:    Memory:   Insight:       ASSESSMENT:      3/7/22 Pt had to cancel at last moment due to brothers death. Will reschedule.        PLAN & RECOMMENDATIONS:

## 2022-04-14 ENCOUNTER — HOSPITAL ENCOUNTER (OUTPATIENT)
Dept: PSYCHIATRY | Age: 52
Setting detail: THERAPIES SERIES
Discharge: HOME | End: 2022-04-14
Payer: COMMERCIAL

## 2022-04-14 DIAGNOSIS — F11.20 UNCOMPLICATED OPIOID DEPENDENCE (HCC): Primary | ICD-10-CM

## 2022-04-14 PROCEDURE — 90792 PSYCH DIAG EVAL W/MED SRVCS: CPT | Performed by: PSYCHIATRY & NEUROLOGY

## 2022-04-14 RX ORDER — BUPRENORPHINE AND NALOXONE 8; 2 MG/1; MG/1
1 FILM, SOLUBLE BUCCAL; SUBLINGUAL 2 TIMES DAILY
Qty: 14 FILM | Refills: 0 | Status: SHIPPED | OUTPATIENT
Start: 2022-04-14 | End: 2022-04-28 | Stop reason: SDUPTHER
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
## 2022-04-20 ENCOUNTER — TELEPHONE (OUTPATIENT)
Dept: INTERNAL MEDICINE | Age: 52
End: 2022-04-20

## 2022-04-20 NOTE — TELEPHONE ENCOUNTER
Was called from [facility] to [facility] to speak with pt. Pt upset he was not referred to foot doctor. Chart reviewed. Pt last seen 3/29/22 per Dr. Yasmany Lindquist and Dr. Vladislav Connolly    \"OA - hand, low back and feet. Diffuse degenerative change on lumbar XR of 3/15/2021. 1st metatarsal degenerative change on L foot on 3/3/2022. L hand degenerative changes as well. Was referred to PT - patient states that he did not receive a phone call on this. Would be conservative otherwise in management - nothing surgical at this time. Could consider trial of Mobic for pain relief. Continue diclofenac gel to affected area. \" Dr. Vladislav Connolly    Pt was to follow up in 1 month or earlier if needed. Pt is scheduled 5/4/22 with his PCP Dr. Del San. Called physical therapy. States pt was scheduled 4/8/22 but he was a No Show. Pt taken to exam room to talk. Pt showed letter he received from Dr. Kay Urban. States he is the one who ordered the xray for his foot. States it is swollen. Pt notified that Dr had stated conservative treatment, Mobic for pain and to follow up in 1 month. Pt asking if he has to be in pain then for another week. Pt reminded he was told to call for an earlier appt if needed. Pt with raised voice, interrupting this nurse. Nurse asked pt twice to lower voice as this nurse not raising her voice at him. Pt stating we only want more money. Pt states he is going to have to pull his records from here. This nurse told patient she was going to go get attending Dr to come and talk with him as he continues to raise his voice and talk over this nurse. Pt picked up letter and stated he was leaving. Dr. Feliciano Butler notified of same. States pt could be added to schedule today. Notified pt had already left. Will attempt to call patient later to see if he wishes to be scheduled for appt. Called and spoke with pt via phone. Offered pt an appointment for today.  Pt states he doesn't want appt in office, he wants appointment with specialist. Pt again voicing complaint that this office is not doing anything. Pt again reminded of PT referral, mobic and diclofenac gel that was ordered and to return for re-eval. Pt states he will refer himself to foot doctor. Pt notified of possible need for referral. States he will find real doctor to order it. Pt notified that residents here are Doctor's. States they are [place] be doctors\" and that \"they better have good insurance. \" States Dr. Vangie Tellez said foot pain was due to his Hep C. States Dr. Mykel Bray told him that wasn't reason. Pt asking what if I have cancer in that foot? Pt reminded he had xray done on that foot. Again asked pt if he wanted appointment for today and call was disconnected.

## 2022-04-21 ENCOUNTER — HOSPITAL ENCOUNTER (OUTPATIENT)
Dept: PSYCHIATRY | Age: 52
Setting detail: THERAPIES SERIES
Discharge: HOME | End: 2022-04-21
Payer: COMMERCIAL

## 2022-04-21 DIAGNOSIS — F11.20 OPIOID DEPENDENCE ON AGONIST THERAPY (HCC): Primary | ICD-10-CM

## 2022-04-21 PROCEDURE — 99214 OFFICE O/P EST MOD 30 MIN: CPT | Performed by: PSYCHIATRY & NEUROLOGY

## 2022-04-21 RX ORDER — BUPRENORPHINE AND NALOXONE 8; 2 MG/1; MG/1
1.5 FILM, SOLUBLE BUCCAL; SUBLINGUAL DAILY
Qty: 11 FILM | Refills: 0 | Status: SHIPPED | OUTPATIENT
Start: 2022-04-21 | End: 2022-04-28 | Stop reason: SDUPTHER

## 2022-04-28 ENCOUNTER — HOSPITAL ENCOUNTER (OUTPATIENT)
Dept: PSYCHIATRY | Age: 52
Setting detail: THERAPIES SERIES
Discharge: HOME | End: 2022-04-28
Payer: COMMERCIAL

## 2022-04-28 DIAGNOSIS — F11.20 OPIOID DEPENDENCE ON AGONIST THERAPY (HCC): Primary | ICD-10-CM

## 2022-04-28 PROCEDURE — 99215 OFFICE O/P EST HI 40 MIN: CPT | Performed by: PSYCHIATRY & NEUROLOGY

## 2022-04-28 RX ORDER — BUPRENORPHINE AND NALOXONE 8; 2 MG/1; MG/1
1.5 FILM, SOLUBLE BUCCAL; SUBLINGUAL DAILY
Qty: 22 FILM | Refills: 0 | Status: SHIPPED | OUTPATIENT
Start: 2022-04-28 | End: 2022-05-13

## 2022-04-28 NOTE — BH NOTE
Addiction Psychiatry Follow Up Note    Recent past HX   using opioids age 41 for with presscription Oxy and Opana Used pain pills 2 years up to 40mg day. Got too expensive and hard to find. Was snorting it and moved to Heroin Using Heroin most days since. Longest he has gone without using 10 months in prison related to posssession      Current Drug Use Including Type/Method of Ingestion/Frequency Spending $40 on on Fentanyl? Heroin . He snorts it. Had 1 yr period of IV use 3 yrs ago but gave it up finding more work and not more pleasurable. This stretch has been like this since 2017  BNZ/Sedatives- No  Cocaine- 2005 for 2 yrs powdered. Now if he uses it is mixed in with the opioid he buys   Amphetamines- No  THC- HS use  Hallucinogens-no  Alcohol-Heavy use early teens till late 19's. Had 2 yr binge in 2005 after fathers death Quit due to 9 DUI's 3 after father death.    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 He has 5 strips left  Will change dose to 1 and 1/2 strip QD  Discussed safe storage and transport of meds  RTC 7 days      Current Self Report Status   Taking 2mg every 3 hrs taking 1.5 strip a day. Sad over loss over loss of brother. No side effects  Denies cravings  Overall feels pretty well other than initial insomnia. Working 40 plus 15 hrs. Discussed importance of having a hobby.    Discussed 5 pillars of sobriety   Drug/Alcohol Relapse  Reports almost no use and only using small amount to help sleep which is      PDMP report   04/21/2022 04/21/2022   1  Buprenorphine-Nalox 8-2mg Film   11.00  7  Matilda Garcia  [id number]  Rit ([id number])  0  12.57 mg  [facility] [place]     04/14/2022 04/14/2022   1  Buprenorphine-Nalox 8-2mg Film   14.00  7  Chela Mcnamara  [id number]  Rit ([id number])  0  16.00 mg  Medicaid          Medications     buprenorphine-naloxone (SUBOXONE) 8-2 MG FILM SL film Place 1 Film under the tongue 2 times daily for 30 days.     hydroCHLOROthiazide (MICROZIDE) 12.5 MG capsule Take 1 capsule by mouth every morning     meloxicam (MOBIC) 7.5 MG tablet Take 1 tablet by mouth daily     diclofenac sodium (VOLTAREN) 1 % GEL Apply 4 g topically 4 times daily as needed for Pain               Urine Tox/Immunoassay   Bupe and opioid? MSE  Casual dress    work clothing groomed hair clean gut  Speech is nl rate tone  Affect full and appropriate  Thoughts are organized  Mood is good, forward thinking still grieving brother  Cognition and memory are nl  Insight is good    Assessment   Opioid Use Disorder on agonist TX almost full remission   Insmonia    Plan  Agreed we would maintain dose at 12 mg of film a day. Advised am/noon and early pm dose incase med was aggravating insomnia. Discussed sleep hygiene and I gave him educational material on this and limits of sedatives which he is skeptical of anyway. He realizes he has a long h/o poor sleep habits and associates use of even low dose opioid with sedation and sleep. Understands he must break this cycle. Will cont Suboxone at 12mg a day and f/u in 2 weeks. Encouraged him to explore 'philosophy\" to support his sobriety but also to help manage emotions due to stress and loss which he understands need for. Will cont relapse prevention and CBT oriented work with him.   RTC 2 weeks    Time spent 45 mins

## 2022-05-11 ENCOUNTER — HOSPITAL ENCOUNTER (OUTPATIENT)
Dept: PSYCHIATRY | Age: 52
Setting detail: THERAPIES SERIES
Discharge: HOME | End: 2022-05-11
Payer: COMMERCIAL

## 2022-05-11 DIAGNOSIS — F11.20 OPIOID DEPENDENCE ON AGONIST THERAPY (HCC): Primary | ICD-10-CM

## 2022-05-11 PROCEDURE — 99213 OFFICE O/P EST LOW 20 MIN: CPT | Performed by: PSYCHIATRY & NEUROLOGY

## 2022-05-11 RX ORDER — BUPRENORPHINE AND NALOXONE 8; 2 MG/1; MG/1
1.5 FILM, SOLUBLE BUCCAL; SUBLINGUAL DAILY
Qty: 42 FILM | Refills: 0 | Status: SHIPPED | OUTPATIENT
Start: 2022-05-11 | End: 2022-06-07 | Stop reason: DRUGHIGH

## 2022-05-11 NOTE — BH NOTE
Addiction Psychiatry Follow Up Note    Recent past HX   using opioids age 41 for with presscription Oxy and Opana Used pain pills 2 years up to 40mg day. Got too expensive and hard to find. Was snorting it and moved to Heroin Using Heroin most days since. Longest he has gone without using 10 months in al[facility] related to posssession      Current Drug Use Including Type/Method of Ingestion/Frequency Spending $40 on on Fentanyl? Heroin . He snorts it. Had 1 yr period of IV use 3 yrs ago but gave it up finding more work and not more pleasurable. This stretch has been like this since 2017  BNZ/Sedatives- No  Cocaine- 2005 for 2 yrs powdered. Now if he uses it is mixed in with the opioid he buys   Amphetamines- No  THC- HS use  Hallucinogens-no  Alcohol-Heavy use early teens till late 19's. Had 2 yr binge in 2005 after fathers death Quit due to 7 DUI's 3 after father death.    Visit 4/21/22     Last visit was doing well but having insomnia. We changed dosing to 4mg TID    Current Self Report Status  Has broken broken bone rt foot unclear how this happened. Has hurt for 4 months. Saw a podiatrist. It is casted and he thinks it needs surgery. Needs MRI.      Feels well on current Suboxone dose of 12mg QD   No side effects   Drug/Alcohol Relapse  No opioid cravings     PDMP report   04/28/2022 04/28/2022   1  Buprenorphine-Nalox 8-2mg Film   22.00  15  Ana Maria Sor  [id number]  Rit ([id number])  0  11.73 mg  Medicaid  [place]     04/21/2022 04/21/2022   1  Buprenorphine-Nalox 8-2mg Film   11.00  7  [facility]  [id number]  Rit ([id number])  0  12.57 mg  Medicaid  [place]     04/14/2022 04/14/2022   1  Buprenorphine-Nalox 8-2mg Film   14.00  7  [facility]  [id number]  Rit ([id number])  0  16.00 mg  Medicaid       04/28/2022 04/28/2022   1  Buprenorphine-Nalox 8-2mg Film   22.00  15  Ana Maria Sor  [id number]  Rit ([id number])  0  11.73 mg  Medicaid  [place]     04/21/2022 04/21/2022   1  Buprenorphine-Nalox 8-2mg Film   11.00  7  Ana Maria Sor  [id number]  Rit ([id number])  0  12.57 mg  [place]  [place] 04/14/2022 04/14/2022   1  Buprenorphine-Nalox 8-2mg Film   14.00  7  Donald Wetzel  [id number]  Rit ([id number])  0  16.00 mg  Medicaid           Medications    buprenorphine-naloxone (SUBOXONE) 8-2 MG FILM SL film Place 1 Film under the tongue 2 times daily for 30 days.     hydroCHLOROthiazide (MICROZIDE) 12.5 MG capsule Take 1 capsule by mouth every morning     meloxicam (MOBIC) 7.5 MG tablet Take 1 tablet by mouth daily       Urine Tox/Immunoassay   Def as pt unable to urinate. MSE  Casual dress   Groomed  Speech Nl Rate Tone  Affect is stable  Mood is fair  Thoughts organized and forward thinking   Cognition and memory nl      Assessment   Opioid Use Disorder on agonist TX almost full remission     Plan    Pt is doing well.    No cravings  No complaint of side effect  Insomnia has resolved     RTC 4 weeks maintain 12mg Suboxone film QD    Time spent with pt 20 min

## 2022-06-03 ENCOUNTER — HOSPITAL ENCOUNTER (OUTPATIENT)
Age: 52
Discharge: HOME | End: 2022-06-03
Payer: COMMERCIAL

## 2022-06-03 LAB
ALBUMIN SERPL-MCNC: 4.4 G/DL (ref 3.5–5.2)
ALP BLD-CCNC: 56 U/L (ref 40–129)
ALT SERPL-CCNC: 18 U/L (ref 0–40)
ANION GAP SERPL CALCULATED.3IONS-SCNC: 12 MMOL/L (ref 7–16)
AST SERPL-CCNC: 26 U/L (ref 0–39)
BASOPHILS ABSOLUTE: 0.05 E9/L (ref 0–0.2)
BASOPHILS RELATIVE PERCENT: 0.6 % (ref 0–2)
BILIRUB SERPL-MCNC: <0.2 MG/DL (ref 0–1.2)
BUN BLDV-MCNC: 10 MG/DL (ref 6–20)
CALCIUM SERPL-MCNC: 9 MG/DL (ref 8.6–10.2)
CHLORIDE BLD-SCNC: 102 MMOL/L (ref 98–107)
CO2: 25 MMOL/L (ref 22–29)
CREAT SERPL-MCNC: 0.7 MG/DL (ref 0.7–1.2)
EOSINOPHILS ABSOLUTE: 0.13 E9/L (ref 0.05–0.5)
EOSINOPHILS RELATIVE PERCENT: 1.7 % (ref 0–6)
GFR AFRICAN AMERICAN: >60
GFR NON-AFRICAN AMERICAN: >60 ML/MIN/1.73
GLUCOSE BLD-MCNC: 97 MG/DL (ref 74–99)
HCT VFR BLD CALC: 41.8 % (ref 37–54)
HEMOGLOBIN: 14.2 G/DL (ref 12.5–16.5)
IMMATURE GRANULOCYTES #: 0.02 E9/L
IMMATURE GRANULOCYTES %: 0.3 % (ref 0–5)
LYMPHOCYTES ABSOLUTE: 2.39 E9/L (ref 1.5–4)
LYMPHOCYTES RELATIVE PERCENT: 30.4 % (ref 20–42)
MCH RBC QN AUTO: 31.6 PG (ref 26–35)
MCHC RBC AUTO-ENTMCNC: 34 % (ref 32–34.5)
MCV RBC AUTO: 93.1 FL (ref 80–99.9)
MONOCYTES ABSOLUTE: 0.32 E9/L (ref 0.1–0.95)
MONOCYTES RELATIVE PERCENT: 4.1 % (ref 2–12)
NEUTROPHILS ABSOLUTE: 4.96 E9/L (ref 1.8–7.3)
NEUTROPHILS RELATIVE PERCENT: 62.9 % (ref 43–80)
PDW BLD-RTO: 13.2 FL (ref 11.5–15)
PLATELET # BLD: 227 E9/L (ref 130–450)
PMV BLD AUTO: 9.7 FL (ref 7–12)
POTASSIUM SERPL-SCNC: 4.4 MMOL/L (ref 3.5–5)
RBC # BLD: 4.49 E12/L (ref 3.8–5.8)
SODIUM BLD-SCNC: 139 MMOL/L (ref 132–146)
TOTAL PROTEIN: 7.1 G/DL (ref 6.4–8.3)
WBC # BLD: 7.9 E9/L (ref 4.5–11.5)

## 2022-06-03 PROCEDURE — 36415 COLL VENOUS BLD VENIPUNCTURE: CPT

## 2022-06-03 PROCEDURE — 87522 HEPATITIS C REVRS TRNSCRPJ: CPT

## 2022-06-03 PROCEDURE — 80053 COMPREHEN METABOLIC PANEL: CPT

## 2022-06-03 PROCEDURE — 85025 COMPLETE CBC W/AUTO DIFF WBC: CPT

## 2022-06-07 ENCOUNTER — HOSPITAL ENCOUNTER (OUTPATIENT)
Dept: PSYCHIATRY | Age: 52
Setting detail: THERAPIES SERIES
Discharge: HOME | End: 2022-06-07
Payer: COMMERCIAL

## 2022-06-07 DIAGNOSIS — F11.20 OPIOID DEPENDENCE ON AGONIST THERAPY (HCC): Primary | ICD-10-CM

## 2022-06-07 LAB
HCV QNT BY NAAT IU/ML: NOT DETECTED IU/ML
HCV QNT BY NAAT LOG IU/ML: NOT DETECTED LOG IU/ML
INTERPRETATION: NOT DETECTED

## 2022-06-07 PROCEDURE — 99213 OFFICE O/P EST LOW 20 MIN: CPT | Performed by: PSYCHIATRY & NEUROLOGY

## 2022-06-07 RX ORDER — BUPRENORPHINE AND NALOXONE 8; 2 MG/1; MG/1
1 FILM, SOLUBLE BUCCAL; SUBLINGUAL 2 TIMES DAILY
Qty: 56 FILM | Refills: 0 | Status: SHIPPED | OUTPATIENT
Start: 2022-06-07 | End: 2022-07-05

## 2022-06-07 NOTE — PROGRESS NOTES
Addiction Psychiatry Follow Up Note  using opioids age 41 for with presscription Oxy and Opana Used pain pills 2 years up to 40mg day. Got too expensive and hard to find. Was snorting it and moved to Heroin Using Heroin most days since. Longest he has gone without using 10 months in [facility] related to posssession      Current Drug Use Including Type/Method of Ingestion/Frequency Spending $40 on on Fentanyl? Heroin . He snorts it. Had 1 yr period of IV use 3 yrs ago but gave it up finding more work and not more pleasurable. This stretch has been like this since 2017  BNZ/Sedatives- No  Cocaine- 2005 for 2 yrs powdered. Now if he uses it is mixed in with the opioid he buys   Amphetamines- No  THC- HS use  Hallucinogens-no  Alcohol-Heavy use early teens till late 19's. Had 2 yr binge in 2005 after fathers death Quit due to 9 DUI's 3 after father death.    Visit 4/21/22     Last visit was doing well but having insomnia. We changed dosing to 4mg TID     Current Self Report Status  Has broken broken bone rt foot unclear how this happened. Has hurt for 4 months. Saw a podiatrist. It is casted and he thinks it needs surgery. Needs MRI.      Recent past HX  Reports cravings are under control. Takes last dose. Has problem at night with 12 hours between doses and feel poorly in am. No relapses   No problems to report  Timing with surgery is still up in the air.    Needs surgery to re brake foot and realign the bones   Current Self Report Status      Drug/Alcohol Relapse      PDMP report       Medications   Medications     buprenorphine-naloxone (SUBOXONE) 8-2 MG FILM SL film Place 1 Film under the tongue 2 times daily for 30 days.     hydroCHLOROthiazide (MICROZIDE) 12.5 MG capsule Take 1 capsule by mouth every morning     meloxicam (MOBIC) 7.5 MG tablet Take 1 tablet by mouth daily       Urine Tox/Immunoassay   def    MSE  Casual dress groomed  Affect is bright  Mood is fair  Thoughts are organized  Cognition is nl  Memory in tact  Forward thinkning      Assessment   Opioid Use Disorder on Agonist TX  Chronic Rt foot pain    Plan  Agreed to increase dose to 16mg day advised he adjust dose perhaps take 8mg at 5pm or 4 at 5pm and 4mg at 9pm  Pt agrees will call with any problems and f/u in 4 weeks    Time spent reviewing hx Tx planning 20 mins

## 2022-06-23 ENCOUNTER — HOSPITAL ENCOUNTER (OUTPATIENT)
Age: 52
Discharge: HOME | End: 2022-06-23
Payer: COMMERCIAL

## 2022-06-23 LAB
ALBUMIN SERPL-MCNC: 4.4 G/DL (ref 3.5–5.2)
ALP BLD-CCNC: 52 U/L (ref 40–129)
ALT SERPL-CCNC: 13 U/L (ref 0–40)
ANION GAP SERPL CALCULATED.3IONS-SCNC: 12 MMOL/L (ref 7–16)
AST SERPL-CCNC: 17 U/L (ref 0–39)
BASOPHILS ABSOLUTE: 0.04 E9/L (ref 0–0.2)
BASOPHILS RELATIVE PERCENT: 0.6 % (ref 0–2)
BILIRUB SERPL-MCNC: 0.3 MG/DL (ref 0–1.2)
BUN BLDV-MCNC: 11 MG/DL (ref 6–20)
CALCIUM SERPL-MCNC: 9.4 MG/DL (ref 8.6–10.2)
CHLORIDE BLD-SCNC: 101 MMOL/L (ref 98–107)
CHOLESTEROL, TOTAL: 167 MG/DL (ref 0–199)
CO2: 26 MMOL/L (ref 22–29)
CREAT SERPL-MCNC: 0.8 MG/DL (ref 0.7–1.2)
EKG ATRIAL RATE: 63 BPM
EKG P AXIS: 43 DEGREES
EKG P-R INTERVAL: 154 MS
EKG Q-T INTERVAL: 418 MS
EKG QRS DURATION: 98 MS
EKG QTC CALCULATION (BAZETT): 427 MS
EKG R AXIS: 62 DEGREES
EKG T AXIS: 52 DEGREES
EKG VENTRICULAR RATE: 63 BPM
EOSINOPHILS ABSOLUTE: 0.12 E9/L (ref 0.05–0.5)
EOSINOPHILS RELATIVE PERCENT: 1.9 % (ref 0–6)
GFR AFRICAN AMERICAN: >60
GFR NON-AFRICAN AMERICAN: >60 ML/MIN/1.73
GLUCOSE BLD-MCNC: 100 MG/DL (ref 74–99)
HBA1C MFR BLD: 4.9 % (ref 4–5.6)
HCT VFR BLD CALC: 47.1 % (ref 37–54)
HDLC SERPL-MCNC: 45 MG/DL
HEMOGLOBIN: 15.9 G/DL (ref 12.5–16.5)
IMMATURE GRANULOCYTES #: 0.01 E9/L
IMMATURE GRANULOCYTES %: 0.2 % (ref 0–5)
LDL CHOLESTEROL CALCULATED: 105 MG/DL (ref 0–99)
LYMPHOCYTES ABSOLUTE: 1.77 E9/L (ref 1.5–4)
LYMPHOCYTES RELATIVE PERCENT: 27.7 % (ref 20–42)
MCH RBC QN AUTO: 32 PG (ref 26–35)
MCHC RBC AUTO-ENTMCNC: 33.8 % (ref 32–34.5)
MCV RBC AUTO: 94.8 FL (ref 80–99.9)
MONOCYTES ABSOLUTE: 0.41 E9/L (ref 0.1–0.95)
MONOCYTES RELATIVE PERCENT: 6.4 % (ref 2–12)
NEUTROPHILS ABSOLUTE: 4.04 E9/L (ref 1.8–7.3)
NEUTROPHILS RELATIVE PERCENT: 63.2 % (ref 43–80)
PDW BLD-RTO: 13.7 FL (ref 11.5–15)
PLATELET # BLD: 235 E9/L (ref 130–450)
PMV BLD AUTO: 9.6 FL (ref 7–12)
POTASSIUM SERPL-SCNC: 4 MMOL/L (ref 3.5–5)
RBC # BLD: 4.97 E12/L (ref 3.8–5.8)
SODIUM BLD-SCNC: 139 MMOL/L (ref 132–146)
TOTAL PROTEIN: 7.5 G/DL (ref 6.4–8.3)
TRIGL SERPL-MCNC: 87 MG/DL (ref 0–149)
TSH SERPL DL<=0.05 MIU/L-ACNC: 0.88 UIU/ML (ref 0.27–4.2)
URIC ACID, SERUM: 4 MG/DL (ref 3.4–7)
VITAMIN D 25-HYDROXY: 45 NG/ML (ref 30–100)
VLDLC SERPL CALC-MCNC: 17 MG/DL
WBC # BLD: 6.4 E9/L (ref 4.5–11.5)

## 2022-06-23 PROCEDURE — 80061 LIPID PANEL: CPT

## 2022-06-23 PROCEDURE — 84550 ASSAY OF BLOOD/URIC ACID: CPT

## 2022-06-23 PROCEDURE — 85025 COMPLETE CBC W/AUTO DIFF WBC: CPT

## 2022-06-23 PROCEDURE — 82306 VITAMIN D 25 HYDROXY: CPT

## 2022-06-23 PROCEDURE — 36415 COLL VENOUS BLD VENIPUNCTURE: CPT

## 2022-06-23 PROCEDURE — 83036 HEMOGLOBIN GLYCOSYLATED A1C: CPT

## 2022-06-23 PROCEDURE — 84443 ASSAY THYROID STIM HORMONE: CPT

## 2022-06-23 PROCEDURE — 80053 COMPREHEN METABOLIC PANEL: CPT

## 2022-06-23 PROCEDURE — 93005 ELECTROCARDIOGRAM TRACING: CPT | Performed by: FAMILY MEDICINE

## 2022-06-27 ENCOUNTER — TELEPHONE (OUTPATIENT)
Dept: INTERNAL MEDICINE | Age: 52
End: 2022-06-27

## 2022-06-27 NOTE — TELEPHONE ENCOUNTER
Left a voicemail message for patient to call the office to schedule his 3 month follow up with his New PCP.

## 2022-06-27 NOTE — TELEPHONE ENCOUNTER
----- Message from Evelyn Hernandez LPN sent at 8/33/1770 11:48 AM EDT -----  Schedule  patient in June 2022 with Dr Niles Wu  or ximena CONTRERAS

## 2022-07-05 ENCOUNTER — HOSPITAL ENCOUNTER (OUTPATIENT)
Dept: PSYCHIATRY | Age: 52
Setting detail: THERAPIES SERIES
Discharge: HOME | End: 2022-07-05
Payer: COMMERCIAL

## 2022-07-05 DIAGNOSIS — F11.20 OPIOID DEPENDENCE ON AGONIST THERAPY (HCC): Primary | ICD-10-CM

## 2022-07-05 PROCEDURE — 99214 OFFICE O/P EST MOD 30 MIN: CPT | Performed by: PSYCHIATRY & NEUROLOGY

## 2022-07-05 RX ORDER — BUPRENORPHINE AND NALOXONE 8; 2 MG/1; MG/1
1 FILM, SOLUBLE BUCCAL; SUBLINGUAL 2 TIMES DAILY
Qty: 56 FILM | Refills: 0 | Status: SHIPPED | OUTPATIENT
Start: 2022-07-05 | End: 2022-08-02

## 2022-07-05 NOTE — PROGRESS NOTES
Addiction/Pain Psychiatry Follow Up Note  Addiction Psychiatry Follow Up Note  using opioids age 41 for with presscription Oxy and Opana Used pain pills 2 years up to 40mg day. Got too expensive and hard to find. Was snorting it and moved to Heroin Using Heroin most days since. Longest he has gone without using 10 months in [facility] related to posssession      Current Drug Use Including Type/Method of Ingestion/Frequency Spending $40 on on Fentanyl? Heroin . He snorts it. Had 1 yr period of IV use 3 yrs ago but gave it up finding more work and not more pleasurable. This stretch has been like this since 2017  BNZ/Sedatives- No  Cocaine- 2005 for 2 yrs powdered. Now if he uses it is mixed in with the opioid he buys   Amphetamines- No  THC- HS use  Hallucinogens-no  Alcohol-Heavy use early teens till late 19's. Had 2 yr binge in 2005 after fathers death Quit due to 9 DUI's 3 after father death.   Recent past HX  Recent past HX  Reports cravings are under control. Takes last dose. Has problem at night with 12 hours between doses and feel poorly in am. No relapses   No problems to report  Timing with surgery is still up in the air. Needs surgery to re brake foot and realign the bones   Agreed to increase dose to 16mg day advised he adjust dose perhaps take 8mg at 5pm or 4 at 5pm and 4mg at 9pm  Pt agrees will call with any problems and f/u in 4 weeks       Current Self Report Status  Admits he continues to use every other day. Discussed IOP program with him. He says he feels irritable and at times is unsure why. Sleep and appetite are good. Discussed Narcan kit but refused saying no one would be around. He is adamant he takes his Suboxone.      Drug/Alcohol Relapse      PDMP report    06/11/2022 06/07/2022   1  Buprenorphine-Nalox 8-2mg Film   56.00  28  Onalee Roys  [id number]  Rit ([id number])  0  16.00 mg  Medicaid  [place]     05/14/2022 05/11/2022   1  Buprenorphine-Nalox 8-2mg Film   42.00  28  Onalee Roys  [id number]  Rit ([id number])  0 12.00 mg  Medicaid [place]     04/28/2022 04/28/2022   1  Buprenorphine-Nalox 8-2mg Film   22.00  15  Rigo Spore  [id number]  Rit ([id number])  0  11.73 mg  Medicaid  [place]     04/21/2022 04/21/2022   1  Buprenorphine-Nalox 8-2mg Film   11.00  7  Ana Maria Sor  [id number]  Rit (8018)  0  12.57 mg  [place]icaid  [place]          Medications   buprenorphine-naloxone (SUBOXONE) 8-2 MG FILM SL film Place 1 Film under the tongue 2 times daily for 28 days. ibuprofen (ADVIL;MOTRIN) 600 MG tablet Take 600 mg by mouth as needed    hydroCHLOROthiazide (MICROZIDE) 12.5 MG capsule Take 1 capsule by mouth every morning    meloxicam (MOBIC) 7.5 MG tablet Take 1 tablet by mouth daily    diclofenac sodium (VOLTAREN) 1 % GEL Apply 4 g topically 4 times daily as needed for Pain    ibuprofen (ADVIL;MOTRIN) 800 MG tablet take 1 tablet by mouth every 6 hours if needed for painPatient not taking: Reported on 5/4/2022                Urine Tox/Immunoassay   Cocaine admits Fentanyl use. TSH 0.270 - 4.200 uIU/mL 0.879  1.420      Component Ref Range & Units 6/23/22 1600 6/3/22 1338 3/9/22 0817 3/3/22 1000 12/14/21 0937 4/23/21 1605 3/15/21 1224   Sodium 132 - 146 mmol/L 139  139   136  137   137    Potassium 3.5 - 5.0 mmol/L 4.0  4.4   4.3  4.3   4.4    Chloride 98 - 107 mmol/L 101  102   100  100   101    CO2 22 - 29 mmol/L 26  25   24  26   25    Anion Gap 7 - 16 mmol/L 12  12   12  11   11    Glucose 74 - 99 mg/dL 100 High   97   99  113 High    99    BUN 6 - 20 mg/dL 11  10   12  10   10    CREATININE 0.7 - 1.2 mg/dL 0.8  0.7   0.7  0.6 Low    0.7    GFR Non-African American >=60 mL/min/1.73 >60  >60 CM   >60 CM  >60 CM   >60 CM    Comment: Chronic Kidney Disease: less than 60 ml/min/1.73 sq. m.         Kidney Failure: less than 15 ml/min/1.73 sq.m. Results valid for patients 18 years and older.     GFR   >60  >60   >60  >60   >60    Calcium 8.6 - 10.2 mg/dL 9.4  9.0   9.0  9.3   9.2    Total Protein 6.4 - 8.3 g/dL 7.5  7.1  7.3  7.4  7.6  7.6  7.9 Albumin 3.5 - 5.2 g/dL 4.4  4.4  4.3  4.3  4.0  3.7 R  4.4    Total Bilirubin 0.0 - 1.2 mg/dL 0.3  <0.2  0.3  <0.2  0.4   0.3    Alkaline Phosphatase 40 - 129 U/L 52  56  54  59  52   65    ALT 0 - 40 U/L 13  18  16  16  20   42 High     AST 0 - 39 U/L 17  26  23  22  24   31    Bilirubin             WBC 4.5 - 11.5 E9/L 6.4  7.9  6.3  5.8  9.4    RBC 3.80 - 5.80 E12/L 4.97  4.49  4.81  5.13  4.70    Hemoglobin 12.5 - 16.5 g/dL 15.9  14.2  14.9  15.7  15.2    Hematocrit 37.0 - 54.0 % 47.1  41.8  43.5  45.1  44.6    MCV 80.0 - 99.9 fL 94.8  93.1  90.4  87.9  94.9    MCH 26.0 - 35.0 pg 32.0  31.6  31.0  30.6  32.3    MCHC 32.0 - 34.5 % 33.8  34.0  34.3  34.8 High   34.1    RDW 11.5 - 15.0 fL 13.7  13.2  12.5  12.5  12.7    Platelets 294 - 412 N1/M 235  227  211  216  153    MPV 7.0 - 12.0 fL 9.6  9.7  10.1  9.7  12.4 High     Neutrophils % 43.0 - 80.0 % 63.2  62.9   62.8     Immature Granulocytes % 0.0 - 5.0 % 0.2  0.3   0.3     Lymphocytes % 20.0 - 42.0 % 27.7  30.4   27.2     Monocytes % 2.0 - 12.0 % 6.4  4.1   6.2     Eosinophils % 0.0 - 6.0 % 1.9  1.7   2.1     Basophils % 0.0 - 2.0 % 0.6  0.6   1.4     Neutrophils Absolute 1.80 - 7.30 E9/L 4.04  4.96   3.64     Immature Granulocytes # E9/L 0.01  0.02   0.02     Lymphocytes Absolute 1.50 - 4.00 E9/L 1.77  2.39   1.58     Monocytes Absolute 0.10 - 0.95 E9/L 0.41  0.32   0.36     Eosinophils Absolute 0.05 - 0.50 E9/L 0.12  0.13   0.12     Basophils Absolute 0.00 - 0.20 E9/L 0.04  0.05   0.08       Component Ref Range & Units 6/23/22 1600 3/9/22 0817 3/15/21 1224   Cholesterol, Total 0 - 199 mg/dL 167  187  139    Triglycerides 0 - 149 mg/dL 87  65  105    HDL >40 mg/dL 45  39  31    LDL Calculated 0 - 99 mg/dL 105 High   135 High   87    VLDL Cholesterol Calculated mg/dL 17  13  21          MSE  Casual dress. Beard trimmed. Speech is nl  Thoughts are organized  Mood fair today affect is stable appropriate. Cognition and Memory are nl.      Assessment   Opioid Dep on Agonist TX  Unintentional Cocaine use   Rt Foot pain H/O FX reports need for surgery   Plan  Cont Suboxone 8mg/2  Film Day  Refer to Substance Abuse IOP/ or even Dual program here at [facility] but needs more intensive TX   Pt agrees will set intake up asap.      Total time spent 30 mins reviewing hx and TX plan

## 2022-07-06 ENCOUNTER — HOSPITAL ENCOUNTER (OUTPATIENT)
Dept: PSYCHIATRY | Age: 52
Setting detail: THERAPIES SERIES
Discharge: HOME | End: 2022-07-06

## 2022-07-06 NOTE — CARE COORDINATION
Date:  7/6/2022    Therapist explored some reasons patient was referred to IOP and what his treatment goals were. Some barriers to attending IOP at [facility] were identified. Patient said he wasn't able to attend IOP during the day due to his work hours and watching his grandson. He said evening IOP would be best for him. Therapist scheduled an appointment for an intake assessment at [facility] on Wednesday July 13 and scheduled transportation through [employer]. Patient agreed to attend this appointment.       Electronically signed by Murray Mahoney [facility] on 7/6/2022 at 3:48 PM

## 2022-07-14 ENCOUNTER — TELEPHONE (OUTPATIENT)
Dept: PSYCHIATRY | Age: 52
End: 2022-07-14

## 2022-07-14 NOTE — TELEPHONE ENCOUNTER
Date:  7/14/2022    Patient said [facility] told him he needed to bring $350 to his intake assessment today so he had to cancel his appointment. Therapist called [facility] and they said he did not need to pay anything since his insurance would cover it and they would call patient to reschedule his intake assessment.       Electronically signed by Robb Villa on 7/14/2022 at 3:30 PM

## 2022-10-03 ENCOUNTER — TELEPHONE (OUTPATIENT)
Dept: INTERNAL MEDICINE | Age: 52
End: 2022-10-03

## 2022-10-03 NOTE — LETTER
[facility]   [address] [facility], [address] [place] [address]  Phone: [phone]  Fax: [phone]      10/3/2022  [address]       Dear Mike caceres:    Your health and follow-up medical care are important to us. Please call our office as soon as possible at [phone] so that we may reschedule your appointment. If you have already rescheduled your appointment, please disregard this letter. We look forward to seeing you soon.        Sincerely,         Brad Early LPN

## 2023-02-01 ENCOUNTER — HOSPITAL ENCOUNTER (OUTPATIENT)
Dept: CT IMAGING | Age: 53
Discharge: HOME | End: 2023-02-03
Payer: COMMERCIAL

## 2023-02-01 DIAGNOSIS — S92.901G: ICD-10-CM

## 2023-02-01 PROCEDURE — 73700 CT LOWER EXTREMITY W/O DYE: CPT

## 2023-03-30 ENCOUNTER — HOSPITAL ENCOUNTER (EMERGENCY)
Age: 53
Discharge: LEFT BEFORE DISCHARGE | End: 2023-03-30

## 2023-03-30 VITALS
OXYGEN SATURATION: 99 % | SYSTOLIC BLOOD PRESSURE: 173 MMHG | RESPIRATION RATE: 18 BRPM | TEMPERATURE: 97.3 F | WEIGHT: 180 LBS | BODY MASS INDEX: 26.66 KG/M2 | HEART RATE: 77 BPM | HEIGHT: 69 IN | DIASTOLIC BLOOD PRESSURE: 109 MMHG

## 2023-03-30 VITALS
SYSTOLIC BLOOD PRESSURE: 132 MMHG | RESPIRATION RATE: 14 BRPM | WEIGHT: 170 LBS | BODY MASS INDEX: 25.18 KG/M2 | TEMPERATURE: 97.9 F | HEART RATE: 77 BPM | DIASTOLIC BLOOD PRESSURE: 83 MMHG | OXYGEN SATURATION: 100 % | HEIGHT: 69 IN

## 2023-03-30 LAB
ALBUMIN SERPL-MCNC: 3.8 G/DL (ref 3.5–5.2)
ALP SERPL-CCNC: 69 U/L (ref 40–129)
ALT SERPL-CCNC: 20 U/L (ref 0–40)
ANION GAP SERPL CALCULATED.3IONS-SCNC: 6 MMOL/L (ref 7–16)
AST SERPL-CCNC: 30 U/L (ref 0–39)
BACTERIA URNS QL MICRO: ABNORMAL /HPF
BASOPHILS # BLD: 0.05 E9/L (ref 0–0.2)
BASOPHILS NFR BLD: 0.6 % (ref 0–2)
BILIRUB SERPL-MCNC: 0.2 MG/DL (ref 0–1.2)
BILIRUB UR QL STRIP: ABNORMAL
BUN SERPL-MCNC: 13 MG/DL (ref 6–20)
CALCIUM SERPL-MCNC: 8.9 MG/DL (ref 8.6–10.2)
CHLORIDE SERPL-SCNC: 101 MMOL/L (ref 98–107)
CLARITY UR: CLEAR
CO2 SERPL-SCNC: 31 MMOL/L (ref 22–29)
COLOR UR: YELLOW
CREAT SERPL-MCNC: 0.6 MG/DL (ref 0.7–1.2)
CRYSTALS URNS MICRO: ABNORMAL /HPF
EOSINOPHIL # BLD: 0.03 E9/L (ref 0.05–0.5)
EOSINOPHIL NFR BLD: 0.4 % (ref 0–6)
ERYTHROCYTE [DISTWIDTH] IN BLOOD BY AUTOMATED COUNT: 13.2 FL (ref 11.5–15)
GLUCOSE SERPL-MCNC: 95 MG/DL (ref 74–99)
GLUCOSE UR STRIP-MCNC: NEGATIVE MG/DL
HCT VFR BLD AUTO: 44.8 % (ref 37–54)
HGB BLD-MCNC: 15.4 G/DL (ref 12.5–16.5)
HGB UR QL STRIP: NEGATIVE
IMM GRANULOCYTES # BLD: 0.03 E9/L
IMM GRANULOCYTES NFR BLD: 0.4 % (ref 0–5)
KETONES UR STRIP-MCNC: ABNORMAL MG/DL
LACTATE BLDV-SCNC: 0.4 MMOL/L (ref 0.5–2.2)
LEUKOCYTE ESTERASE UR QL STRIP: NEGATIVE
LIPASE: 24 U/L (ref 13–60)
LYMPHOCYTES # BLD: 1.66 E9/L (ref 1.5–4)
LYMPHOCYTES NFR BLD: 21.2 % (ref 20–42)
MCH RBC QN AUTO: 32.2 PG (ref 26–35)
MCHC RBC AUTO-ENTMCNC: 34.4 % (ref 32–34.5)
MCV RBC AUTO: 93.7 FL (ref 80–99.9)
MONOCYTES # BLD: 0.72 E9/L (ref 0.1–0.95)
MONOCYTES NFR BLD: 9.2 % (ref 2–12)
NEUTROPHILS # BLD: 5.34 E9/L (ref 1.8–7.3)
NEUTS SEG NFR BLD: 68.2 % (ref 43–80)
NITRITE UR QL STRIP: NEGATIVE
PH UR STRIP: 6.5 [PH] (ref 5–9)
PLATELET # BLD AUTO: 258 E9/L (ref 130–450)
PMV BLD AUTO: 9.2 FL (ref 7–12)
POTASSIUM SERPL-SCNC: 4.1 MMOL/L (ref 3.5–5)
PROT SERPL-MCNC: 7.1 G/DL (ref 6.4–8.3)
PROT UR STRIP-MCNC: ABNORMAL MG/DL
RBC # BLD AUTO: 4.78 E12/L (ref 3.8–5.8)
RBC #/AREA URNS HPF: ABNORMAL /HPF (ref 0–2)
SODIUM SERPL-SCNC: 138 MMOL/L (ref 132–146)
SP GR UR STRIP: 1.02 (ref 1–1.03)
UROBILINOGEN UR STRIP-ACNC: 1 E.U./DL
WBC # BLD: 7.8 E9/L (ref 4.5–11.5)
WBC #/AREA URNS HPF: ABNORMAL /HPF (ref 0–5)

## 2023-03-30 PROCEDURE — 83690 ASSAY OF LIPASE: CPT

## 2023-03-30 PROCEDURE — 83605 ASSAY OF LACTIC ACID: CPT

## 2023-03-30 PROCEDURE — 81001 URINALYSIS AUTO W/SCOPE: CPT

## 2023-03-30 PROCEDURE — 80053 COMPREHEN METABOLIC PANEL: CPT

## 2023-03-30 PROCEDURE — 99283 EMERGENCY DEPT VISIT LOW MDM: CPT

## 2023-03-30 PROCEDURE — 85025 COMPLETE CBC W/AUTO DIFF WBC: CPT

## 2023-03-30 ASSESSMENT — PAIN - FUNCTIONAL ASSESSMENT: PAIN_FUNCTIONAL_ASSESSMENT: 0-10

## 2023-03-30 ASSESSMENT — PAIN DESCRIPTION - LOCATION: LOCATION: ABDOMEN

## 2023-03-30 ASSESSMENT — PAIN SCALES - GENERAL: PAINLEVEL_OUTOF10: 8

## 2023-03-30 ASSESSMENT — PAIN DESCRIPTION - DESCRIPTORS: DESCRIPTORS: ACHING

## 2023-03-30 ASSESSMENT — PAIN DESCRIPTION - PAIN TYPE: TYPE: ACUTE PAIN

## 2023-03-30 ASSESSMENT — PAIN DESCRIPTION - FREQUENCY: FREQUENCY: CONTINUOUS

## 2023-03-30 NOTE — ED TRIAGE NOTES
FIRST PROVIDER CONTACT ASSESSMENT NOTE      Department of Emergency Medicine   Admit Date: No admission date for patient encounter. Chief Complaint: Abdominal Pain (Abdominal pain x 3 days. +n/v. Denies fever/chills. )      History of Present Illness:    Jonh Fontana is a 50 y.o. male who presents to the ED for 3 days of midline abdominal pain, just above the umbilicus. Positive nausea and vomiting. Denies diarrhea. Denies dysuria. No history of abdominal surgeries.   Denies alcohol use.        -----------------END OF FIRST PROVIDER CONTACT ASSESSMENT NOTE--------------  Electronically signed by GABINO Gentlie   DD: 3/30/23

## 2023-03-31 ENCOUNTER — HOSPITAL ENCOUNTER (EMERGENCY)
Age: 53
Discharge: LEFT BEFORE DISCHARGE | End: 2023-03-31
Payer: COMMERCIAL

## 2023-03-31 DIAGNOSIS — Z53.21 ELOPED FROM EMERGENCY DEPARTMENT: Primary | ICD-10-CM

## 2023-03-31 NOTE — ED NOTES
Department of Emergency Medicine    FIRST PROVIDER ELOPEMENT NOTE                 Independent ML          3/31/23  12:35 AM EDT    MRN: [id number]    HPI: Polo Gonzalez is a 50 y.o. male who presents to the ED with the following complaint: Abdominal Pain (Abdominal pain x 3 days. +n/v.  Denies fever/chills. )      (Please refer to [address] [place] [id number] for pertinent ROS and PE documentation)    Labs:  Results for orders placed or performed during the hospital encounter of 03/30/23   CBC with Auto Differential   Result Value Ref Range    WBC 7.8 4.5 - 11.5 E9/L    RBC 4.78 3.80 - 5.80 E12/L    Hemoglobin 15.4 12.5 - 16.5 g/dL    Hematocrit 44.8 37.0 - 54.0 %    MCV 93.7 80.0 - 99.9 fL    MCH 32.2 26.0 - 35.0 pg    MCHC 34.4 32.0 - 34.5 %    RDW 13.2 11.5 - 15.0 fL    Platelets 914 917 - 530 E9/L    MPV 9.2 7.0 - 12.0 fL    Neutrophils % 68.2 43.0 - 80.0 %    Immature Granulocytes % 0.4 0.0 - 5.0 %    Lymphocytes % 21.2 20.0 - 42.0 %    Monocytes % 9.2 2.0 - 12.0 %    Eosinophils % 0.4 0.0 - 6.0 %    Basophils % 0.6 0.0 - 2.0 %    Neutrophils Absolute 5.34 1.80 - 7.30 E9/L    Immature Granulocytes # 0.03 E9/L    Lymphocytes Absolute 1.66 1.50 - 4.00 E9/L    Monocytes Absolute 0.72 0.10 - 0.95 E9/L    Eosinophils Absolute 0.03 (L) 0.05 - 0.50 E9/L    Basophils Absolute 0.05 0.00 - 0.20 E9/L   CMP   Result Value Ref Range    Sodium 138 132 - 146 mmol/L    Potassium 4.1 3.5 - 5.0 mmol/L    Chloride 101 98 - 107 mmol/L    CO2 31 (H) 22 - 29 mmol/L    Anion Gap 6 (L) 7 - 16 mmol/L    Glucose 95 74 - 99 mg/dL    BUN 13 6 - 20 mg/dL    Creatinine 0.6 (L) 0.7 - 1.2 mg/dL    Est, Glom Filt Rate >60 >=60 mL/min/1.73    Calcium 8.9 8.6 - 10.2 mg/dL    Total Protein 7.1 6.4 - 8.3 g/dL    Albumin 3.8 3.5 - 5.2 g/dL    Total Bilirubin 0.2 0.0 - 1.2 mg/dL    Alkaline Phosphatase 69 40 - 129 U/L    ALT 20 0 - 40 U/L    AST 30 0 - 39 U/L   Lactic Acid   Result Value Ref Range    Lactic Acid 0.4 (L) 0.5 - 2.2 mmol/L Lipase   Result Value Ref Range    Lipase 24 13 - 60 U/L   Urinalysis   Result Value Ref Range    Color, UA Yellow Straw/Yellow    Clarity, UA Clear Clear    Glucose, Ur Negative Negative mg/dL    Bilirubin Urine SMALL (A) Negative    Ketones, Urine TRACE (A) Negative mg/dL    Specific Gravity, UA 1.025 1.005 - 1.030    Blood, Urine Negative Negative    pH, UA 6.5 5.0 - 9.0    Protein, UA TRACE Negative mg/dL    Urobilinogen, Urine 1.0 <2.0 E.U./dL    Nitrite, Urine Negative Negative    Leukocyte Esterase, Urine Negative Negative   Microscopic Urinalysis   Result Value Ref Range    WBC, UA 0-1 0 - 5 /HPF    RBC, UA NONE 0 - 2 /HPF    Bacteria, UA NONE SEEN None Seen /HPF    Crystals, UA Rare (A) None Seen /HPF     Imaging: All Radiology results interpreted by Radiologist unless otherwise noted. CT ABDOMEN PELVIS W IV CONTRAST Additional Contrast? None    (Results Pending)     ED Course    Medications - No data to display     -------------------------  Disposition    Patient ELOPED from the department prior to completion of evaluation after triage. Results of triage orders that were completed at time of elopement as indicated above were reviewed.     Diagnosis at Time of Elopement: (Based on presenting complaint/available test results):     Electronically signed by Darlyn Rosales PA-C   DD: 3/31/23       Darlyn Rosales PA-C  03/31/23 1317  ATTENDING PROVIDER ATTESTATION:     Supervising Physician, on-site, available for consultation, non-participatory in the evaluation or care of this patient       Miya Ruff MD  03/31/23 0706

## 2023-11-09 ENCOUNTER — HOSPITAL ENCOUNTER (INPATIENT)
Dept: HOSPITAL 83 - 5E | Age: 53
LOS: 3 days | Discharge: HOME | DRG: 773 | End: 2023-11-12
Attending: INTERNAL MEDICINE | Admitting: INTERNAL MEDICINE
Payer: COMMERCIAL

## 2023-11-09 VITALS — SYSTOLIC BLOOD PRESSURE: 134 MMHG | DIASTOLIC BLOOD PRESSURE: 88 MMHG

## 2023-11-09 VITALS — SYSTOLIC BLOOD PRESSURE: 146 MMHG | DIASTOLIC BLOOD PRESSURE: 95 MMHG

## 2023-11-09 VITALS — DIASTOLIC BLOOD PRESSURE: 87 MMHG | SYSTOLIC BLOOD PRESSURE: 130 MMHG

## 2023-11-09 VITALS — BODY MASS INDEX: 26.67 KG/M2 | HEIGHT: 69 IN | WEIGHT: 180.06 LBS

## 2023-11-09 DIAGNOSIS — F32.9: ICD-10-CM

## 2023-11-09 DIAGNOSIS — R00.1: ICD-10-CM

## 2023-11-09 DIAGNOSIS — F11.11: ICD-10-CM

## 2023-11-09 DIAGNOSIS — Z80.1: ICD-10-CM

## 2023-11-09 DIAGNOSIS — R73.9: ICD-10-CM

## 2023-11-09 DIAGNOSIS — F14.90: ICD-10-CM

## 2023-11-09 DIAGNOSIS — F10.139: Primary | ICD-10-CM

## 2023-11-09 DIAGNOSIS — F17.200: ICD-10-CM

## 2023-11-09 DIAGNOSIS — F11.13: ICD-10-CM

## 2023-11-09 DIAGNOSIS — Z80.8: ICD-10-CM
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
## 2023-11-09 LAB
ALP SERPL-CCNC: 59 U/L (ref 46–116)
ALT SERPL W P-5'-P-CCNC: 17 U/L (ref 5–49)
BACTERIA #/AREA URNS HPF: (no result) /[HPF]
BASOPHILS # BLD AUTO: 0 10*3/UL (ref 0–0.1)
BASOPHILS NFR BLD AUTO: 0.6 % (ref 0–1)
BUN SERPL-MCNC: 10 MG/DL (ref 9–23)
CHLORIDE SERPL-SCNC: 106 MMOL/L (ref 98–107)
EOSINOPHIL # BLD AUTO: 0.1 10*3/UL (ref 0–0.4)
EOSINOPHIL # BLD AUTO: 1.4 % (ref 1–4)
EPI CELLS #/AREA URNS HPF: (no result) /[HPF]
ERYTHROCYTE [DISTWIDTH] IN BLOOD BY AUTOMATED COUNT: 12.6 % (ref 0–14.5)
ETHANOL SERPL-MCNC: < 3 MG/DL (ref ?–3)
HCT VFR BLD AUTO: 45.3 % (ref 42–52)
LYMPHOCYTES # BLD AUTO: 0.9 10*3/UL (ref 1.3–4.4)
LYMPHOCYTES NFR BLD AUTO: 13.1 % (ref 27–41)
MCH RBC QN AUTO: 31.5 PG (ref 27–31)
MCHC RBC AUTO-ENTMCNC: 34.4 G/DL (ref 33–37)
MCV RBC AUTO: 91.3 FL (ref 80–94)
MONOCYTES # BLD AUTO: 0.4 10*3/UL (ref 0.1–1)
MONOCYTES NFR BLD MANUAL: 4.9 % (ref 3–9)
NEUT #: 5.7 10*3/UL (ref 2.3–7.9)
NEUT %: 79.9 % (ref 47–73)
NRBC BLD QL AUTO: 0 10*3/UL (ref 0–0)
PH UR STRIP: 7.5 [PH] (ref 4.5–8)
PLATELET # BLD AUTO: 267 10*3/UL (ref 130–400)
PMV BLD AUTO: 8.9 FL (ref 9.6–12.3)
POTASSIUM SERPL-SCNC: 4 MMOL/L (ref 3.4–5.1)
PROT SERPL-MCNC: 7.3 GM/DL (ref 6–8)
RBC # BLD AUTO: 4.96 10*6/UL (ref 4.5–5.9)
SP GR UR: 1.01 (ref 1–1.03)
UROBILINOGEN UR STRIP-MCNC: 0.2 E.U./DL (ref 0–1)
WBC #/AREA URNS HPF: (no result) WBC/HPF (ref 0–5)
WBC NRBC COR # BLD AUTO: 7.2 10*3/UL (ref 4.8–10.8)

## 2023-11-10 VITALS — SYSTOLIC BLOOD PRESSURE: 110 MMHG | DIASTOLIC BLOOD PRESSURE: 59 MMHG

## 2023-11-10 VITALS — SYSTOLIC BLOOD PRESSURE: 156 MMHG | DIASTOLIC BLOOD PRESSURE: 94 MMHG

## 2023-11-10 VITALS — SYSTOLIC BLOOD PRESSURE: 163 MMHG | DIASTOLIC BLOOD PRESSURE: 64 MMHG

## 2023-11-10 VITALS — DIASTOLIC BLOOD PRESSURE: 80 MMHG

## 2023-11-10 VITALS — DIASTOLIC BLOOD PRESSURE: 90 MMHG | SYSTOLIC BLOOD PRESSURE: 145 MMHG

## 2023-11-10 LAB
ALP SERPL-CCNC: 58 U/L (ref 46–116)
ALT SERPL W P-5'-P-CCNC: 15 U/L (ref 5–49)
BASOPHILS # BLD AUTO: 0 10*3/UL (ref 0–0.1)
BASOPHILS NFR BLD AUTO: 0.2 % (ref 0–1)
BUN SERPL-MCNC: 7 MG/DL (ref 9–23)
CHLORIDE SERPL-SCNC: 106 MMOL/L (ref 98–107)
CHOLEST SERPL-MCNC: 175 MG/DL (ref ?–200)
EOSINOPHIL # BLD AUTO: 0 10*3/UL (ref 0–0.4)
EOSINOPHIL # BLD AUTO: 0.1 % (ref 1–4)
ERYTHROCYTE [DISTWIDTH] IN BLOOD BY AUTOMATED COUNT: 12.4 % (ref 0–14.5)
HCT VFR BLD AUTO: 45.7 % (ref 42–52)
LDLC SERPL DIRECT ASSAY-MCNC: 117 MG/DL (ref 9–159)
LYMPHOCYTES # BLD AUTO: 1 10*3/UL (ref 1.3–4.4)
LYMPHOCYTES NFR BLD AUTO: 11.4 % (ref 27–41)
MCH RBC QN AUTO: 31 PG (ref 27–31)
MCHC RBC AUTO-ENTMCNC: 34.6 G/DL (ref 33–37)
MCV RBC AUTO: 89.8 FL (ref 80–94)
MONOCYTES # BLD AUTO: 0.4 10*3/UL (ref 0.1–1)
MONOCYTES NFR BLD MANUAL: 4.8 % (ref 3–9)
NEUT #: 6.9 10*3/UL (ref 2.3–7.9)
NEUT %: 83.3 % (ref 47–73)
NRBC BLD QL AUTO: 0 % (ref 0–0)
PLATELET # BLD AUTO: 246 10*3/UL (ref 130–400)
PMV BLD AUTO: 9.1 FL (ref 9.6–12.3)
POTASSIUM SERPL-SCNC: 3.7 MMOL/L (ref 3.4–5.1)
PROT SERPL-MCNC: 7.3 GM/DL (ref 6–8)
RBC # BLD AUTO: 5.09 10*6/UL (ref 4.5–5.9)
T4 FREE SERPL-MCNC: 0.9 NG/DL (ref 0.89–1.76)
TRIGL SERPL-MCNC: 50 MG/DL (ref ?–150)
WBC NRBC COR # BLD AUTO: 8.3 10*3/UL (ref 4.8–10.8)

## 2023-11-11 VITALS — SYSTOLIC BLOOD PRESSURE: 152 MMHG | DIASTOLIC BLOOD PRESSURE: 92 MMHG

## 2023-11-11 VITALS — SYSTOLIC BLOOD PRESSURE: 158 MMHG | DIASTOLIC BLOOD PRESSURE: 89 MMHG

## 2023-11-11 VITALS — DIASTOLIC BLOOD PRESSURE: 84 MMHG

## 2023-11-11 VITALS — DIASTOLIC BLOOD PRESSURE: 93 MMHG

## 2023-11-12 VITALS — DIASTOLIC BLOOD PRESSURE: 92 MMHG | SYSTOLIC BLOOD PRESSURE: 138 MMHG

## 2023-11-12 VITALS — DIASTOLIC BLOOD PRESSURE: 80 MMHG | SYSTOLIC BLOOD PRESSURE: 150 MMHG

## 2024-01-02 ENCOUNTER — TELEPHONE (OUTPATIENT)
Dept: CASE MANAGEMENT | Age: 54
End: 2024-01-02

## 2024-01-02 NOTE — TELEPHONE ENCOUNTER
I called the patient and he confirmed his CT lung screening at [facility] on 1/3/2024 at 5:00 pm.  I reminded the patient to arrive at 4:30 pm, enter through the main entrance, and register. Patient confirmed.          Electronically signed by Kiley Kirkland on 1/2/24 at 2:53 PM EST

## 2024-01-03 ENCOUNTER — HOSPITAL ENCOUNTER (OUTPATIENT)
Dept: CT IMAGING | Age: 54
Discharge: HOME | End: 2024-01-05
Payer: COMMERCIAL

## 2024-01-03 DIAGNOSIS — F17.210 CIGARETTE SMOKER: ICD-10-CM

## 2024-01-03 PROCEDURE — 71271 CT THORAX LUNG CANCER SCR C-: CPT

## 2024-01-04 ENCOUNTER — TELEPHONE (OUTPATIENT)
Dept: CASE MANAGEMENT | Age: 54
End: 2024-01-04

## 2024-01-04 NOTE — TELEPHONE ENCOUNTER
No call, encounter opened to process CT Lung Screening.    CT Lung Screen: 1/3/2024    IMPRESSION:  1. There is no pulmonary infiltrate, mass or suspicious pulmonary nodule  2. Mild emphysematous changes     LUNG RADS:  Lung-RADS 1 - Negative (v2022)     Management:  12 month screening LDCT     RECOMMENDATIONS:  If you would like to register your patient with the [facility] Lung Nodule/Lung  Cancer Screening Program, please contact the Nurse Navigator at  [phone].    Pack years: 30    Social History     Tobacco Use  Smoking Status: Current Every Day Smoker    Start Date:    Quit Date:    Types: Cigarettes   Packs/Day: 1   Years: 30   Pack Years: 30   Smokeless Tobacco: Never         Results letter sent to patient via my chart or mailed.     Kiley Kirkland  Imaging Navigator   [facility]   [phone]

## 2024-05-13 ENCOUNTER — APPOINTMENT (OUTPATIENT)
Dept: GENERAL RADIOLOGY | Age: 54
End: 2024-05-13
Payer: COMMERCIAL

## 2024-05-13 LAB
ALBUMIN SERPL-MCNC: 4.4 G/DL (ref 3.5–5.2)
ALP SERPL-CCNC: 61 U/L (ref 40–129)
ALT SERPL-CCNC: 22 U/L (ref 0–40)
ANION GAP SERPL CALCULATED.3IONS-SCNC: 10 MMOL/L (ref 7–16)
AST SERPL-CCNC: 37 U/L (ref 0–39)
BASOPHILS # BLD: 0.03 K/UL (ref 0–0.2)
BASOPHILS NFR BLD: 0 % (ref 0–2)
BILIRUB SERPL-MCNC: 0.5 MG/DL (ref 0–1.2)
BUN SERPL-MCNC: 13 MG/DL (ref 6–20)
CALCIUM SERPL-MCNC: 9.4 MG/DL (ref 8.6–10.2)
CHLORIDE SERPL-SCNC: 97 MMOL/L (ref 98–107)
CO2 SERPL-SCNC: 29 MMOL/L (ref 22–29)
CREAT SERPL-MCNC: 0.8 MG/DL (ref 0.7–1.2)
CRP SERPL HS-MCNC: 17 MG/L (ref 0–5)
EOSINOPHIL # BLD: 0.02 K/UL (ref 0.05–0.5)
EOSINOPHILS RELATIVE PERCENT: 0 % (ref 0–6)
ERYTHROCYTE [DISTWIDTH] IN BLOOD BY AUTOMATED COUNT: 12.9 % (ref 11.5–15)
GFR, ESTIMATED: >90 ML/MIN/1.73M2
GLUCOSE SERPL-MCNC: 115 MG/DL (ref 74–99)
HCT VFR BLD AUTO: 42.5 % (ref 37–54)
HGB BLD-MCNC: 14.5 G/DL (ref 12.5–16.5)
IMM GRANULOCYTES # BLD AUTO: 0.04 K/UL (ref 0–0.58)
IMM GRANULOCYTES NFR BLD: 0 % (ref 0–5)
LACTATE BLDV-SCNC: 0.7 MMOL/L (ref 0.5–2.2)
LYMPHOCYTES NFR BLD: 0.92 K/UL (ref 1.5–4)
LYMPHOCYTES RELATIVE PERCENT: 9 % (ref 20–42)
MCH RBC QN AUTO: 31.4 PG (ref 26–35)
MCHC RBC AUTO-ENTMCNC: 34.1 G/DL (ref 32–34.5)
MCV RBC AUTO: 92 FL (ref 80–99.9)
MONOCYTES NFR BLD: 0.78 K/UL (ref 0.1–0.95)
MONOCYTES NFR BLD: 8 % (ref 2–12)
NEUTROPHILS NFR BLD: 83 % (ref 43–80)
NEUTS SEG NFR BLD: 8.63 K/UL (ref 1.8–7.3)
PLATELET # BLD AUTO: 295 K/UL (ref 130–450)
PMV BLD AUTO: 9.4 FL (ref 7–12)
POTASSIUM SERPL-SCNC: 4.1 MMOL/L (ref 3.5–5)
PROT SERPL-MCNC: 7.8 G/DL (ref 6.4–8.3)
RBC # BLD AUTO: 4.62 M/UL (ref 3.8–5.8)
SODIUM SERPL-SCNC: 136 MMOL/L (ref 132–146)
WBC OTHER # BLD: 10.4 K/UL (ref 4.5–11.5)

## 2024-05-13 PROCEDURE — 87070 CULTURE OTHR SPECIMN AEROBIC: CPT

## 2024-05-13 PROCEDURE — 87205 SMEAR GRAM STAIN: CPT

## 2024-05-13 PROCEDURE — 73090 X-RAY EXAM OF FOREARM: CPT

## 2024-05-13 PROCEDURE — 87040 BLOOD CULTURE FOR BACTERIA: CPT

## 2024-05-13 PROCEDURE — 87075 CULTR BACTERIA EXCEPT BLOOD: CPT

## 2024-05-13 PROCEDURE — 2580000003 HC RX 258: Performed by: NURSE PRACTITIONER

## 2024-05-13 PROCEDURE — 87077 CULTURE AEROBIC IDENTIFY: CPT

## 2024-05-13 PROCEDURE — 85652 RBC SED RATE AUTOMATED: CPT

## 2024-05-13 PROCEDURE — 86403 PARTICLE AGGLUT ANTBDY SCRN: CPT

## 2024-05-13 PROCEDURE — 96361 HYDRATE IV INFUSION ADD-ON: CPT

## 2024-05-13 PROCEDURE — 80053 COMPREHEN METABOLIC PANEL: CPT

## 2024-05-13 PROCEDURE — 85025 COMPLETE CBC W/AUTO DIFF WBC: CPT

## 2024-05-13 PROCEDURE — 86140 C-REACTIVE PROTEIN: CPT

## 2024-05-13 PROCEDURE — 6360000002 HC RX W HCPCS: Performed by: NURSE PRACTITIONER

## 2024-05-13 PROCEDURE — 99285 EMERGENCY DEPT VISIT HI MDM: CPT

## 2024-05-13 PROCEDURE — 96375 TX/PRO/DX INJ NEW DRUG ADDON: CPT

## 2024-05-13 PROCEDURE — 83605 ASSAY OF LACTIC ACID: CPT

## 2024-05-13 RX ORDER — 0.9 % SODIUM CHLORIDE 0.9 %
1000 INTRAVENOUS SOLUTION INTRAVENOUS ONCE
Status: COMPLETED | OUTPATIENT
Start: 2024-05-13 | End: 2024-05-14

## 2024-05-13 RX ORDER — KETOROLAC TROMETHAMINE 30 MG/ML
30 INJECTION, SOLUTION INTRAMUSCULAR; INTRAVENOUS ONCE
Status: COMPLETED | OUTPATIENT
Start: 2024-05-13 | End: 2024-05-13

## 2024-05-13 RX ADMIN — SODIUM CHLORIDE 1000 ML: 9 INJECTION, SOLUTION INTRAVENOUS at 22:57

## 2024-05-13 RX ADMIN — KETOROLAC TROMETHAMINE 30 MG: 30 INJECTION, SOLUTION INTRAMUSCULAR at 22:56

## 2024-05-13 ASSESSMENT — PAIN SCALES - GENERAL: PAINLEVEL_OUTOF10: 10

## 2024-05-14 ENCOUNTER — HOSPITAL ENCOUNTER (OUTPATIENT)
Age: 54
Setting detail: OBSERVATION
Discharge: LEFT BEFORE DISCHARGE | End: 2024-05-14
Attending: INTERNAL MEDICINE | Admitting: INTERNAL MEDICINE
Payer: COMMERCIAL

## 2024-05-14 ENCOUNTER — APPOINTMENT (OUTPATIENT)
Dept: CT IMAGING | Age: 54
End: 2024-05-14
Payer: COMMERCIAL

## 2024-05-14 VITALS
SYSTOLIC BLOOD PRESSURE: 141 MMHG | BODY MASS INDEX: 26.66 KG/M2 | HEART RATE: 58 BPM | RESPIRATION RATE: 14 BRPM | DIASTOLIC BLOOD PRESSURE: 83 MMHG | OXYGEN SATURATION: 100 % | WEIGHT: 180 LBS | HEIGHT: 69 IN | TEMPERATURE: 97.6 F

## 2024-05-14 DIAGNOSIS — L03.113 CELLULITIS OF RIGHT UPPER EXTREMITY: Primary | ICD-10-CM

## 2024-05-14 DIAGNOSIS — S41.111A LACERATION OF RIGHT UPPER EXTREMITY WITH COMPLICATION, INITIAL ENCOUNTER: ICD-10-CM

## 2024-05-14 PROBLEM — L03.90 CELLULITIS: Status: ACTIVE | Noted: 2024-05-14

## 2024-05-14 LAB — ERYTHROCYTE [SEDIMENTATION RATE] IN BLOOD BY WESTERGREN METHOD: 16 MM/HR (ref 0–15)

## 2024-05-14 PROCEDURE — 96367 TX/PROPH/DG ADDL SEQ IV INF: CPT

## 2024-05-14 PROCEDURE — 6370000000 HC RX 637 (ALT 250 FOR IP): Performed by: INTERNAL MEDICINE

## 2024-05-14 PROCEDURE — 6360000002 HC RX W HCPCS: Performed by: INTERNAL MEDICINE

## 2024-05-14 PROCEDURE — 96361 HYDRATE IV INFUSION ADD-ON: CPT

## 2024-05-14 PROCEDURE — 96366 THER/PROPH/DIAG IV INF ADDON: CPT

## 2024-05-14 PROCEDURE — 96365 THER/PROPH/DIAG IV INF INIT: CPT

## 2024-05-14 PROCEDURE — 99223 1ST HOSP IP/OBS HIGH 75: CPT | Performed by: INTERNAL MEDICINE

## 2024-05-14 PROCEDURE — 6360000004 HC RX CONTRAST MEDICATION: Performed by: RADIOLOGY

## 2024-05-14 PROCEDURE — 73201 CT UPPER EXTREMITY W/DYE: CPT

## 2024-05-14 PROCEDURE — G0378 HOSPITAL OBSERVATION PER HR: HCPCS

## 2024-05-14 PROCEDURE — 2580000003 HC RX 258: Performed by: INTERNAL MEDICINE

## 2024-05-14 PROCEDURE — 99234 HOSP IP/OBS SM DT SF/LOW 45: CPT | Performed by: INTERNAL MEDICINE

## 2024-05-14 RX ORDER — SODIUM CHLORIDE 0.9 % (FLUSH) 0.9 %
5-40 SYRINGE (ML) INJECTION EVERY 12 HOURS SCHEDULED
Status: DISCONTINUED | OUTPATIENT
Start: 2024-05-14 | End: 2024-05-14 | Stop reason: HOSPADM

## 2024-05-14 RX ORDER — ACETAMINOPHEN 650 MG/1
650 SUPPOSITORY RECTAL EVERY 6 HOURS PRN
Status: DISCONTINUED | OUTPATIENT
Start: 2024-05-14 | End: 2024-05-14 | Stop reason: HOSPADM

## 2024-05-14 RX ORDER — POTASSIUM CHLORIDE 20 MEQ/1
40 TABLET, EXTENDED RELEASE ORAL PRN
Status: DISCONTINUED | OUTPATIENT
Start: 2024-05-14 | End: 2024-05-14 | Stop reason: HOSPADM

## 2024-05-14 RX ORDER — ONDANSETRON 4 MG/1
4 TABLET, ORALLY DISINTEGRATING ORAL EVERY 8 HOURS PRN
Status: DISCONTINUED | OUTPATIENT
Start: 2024-05-14 | End: 2024-05-14 | Stop reason: HOSPADM

## 2024-05-14 RX ORDER — LANOLIN ALCOHOL/MO/W.PET/CERES
3 CREAM (GRAM) TOPICAL NIGHTLY
Status: DISCONTINUED | OUTPATIENT
Start: 2024-05-14 | End: 2024-05-14 | Stop reason: HOSPADM

## 2024-05-14 RX ORDER — SODIUM CHLORIDE 0.9 % (FLUSH) 0.9 %
5-40 SYRINGE (ML) INJECTION PRN
Status: DISCONTINUED | OUTPATIENT
Start: 2024-05-14 | End: 2024-05-14 | Stop reason: HOSPADM

## 2024-05-14 RX ORDER — MAGNESIUM SULFATE IN WATER 40 MG/ML
2000 INJECTION, SOLUTION INTRAVENOUS PRN
Status: DISCONTINUED | OUTPATIENT
Start: 2024-05-14 | End: 2024-05-14 | Stop reason: HOSPADM

## 2024-05-14 RX ORDER — SODIUM CHLORIDE 9 MG/ML
INJECTION, SOLUTION INTRAVENOUS PRN
Status: DISCONTINUED | OUTPATIENT
Start: 2024-05-14 | End: 2024-05-14 | Stop reason: HOSPADM

## 2024-05-14 RX ORDER — ONDANSETRON 2 MG/ML
4 INJECTION INTRAMUSCULAR; INTRAVENOUS EVERY 6 HOURS PRN
Status: DISCONTINUED | OUTPATIENT
Start: 2024-05-14 | End: 2024-05-14 | Stop reason: HOSPADM

## 2024-05-14 RX ORDER — IBUPROFEN 400 MG/1
400 TABLET ORAL EVERY 6 HOURS PRN
Status: DISCONTINUED | OUTPATIENT
Start: 2024-05-14 | End: 2024-05-14 | Stop reason: HOSPADM

## 2024-05-14 RX ORDER — ACETAMINOPHEN 325 MG/1
650 TABLET ORAL EVERY 6 HOURS PRN
Status: DISCONTINUED | OUTPATIENT
Start: 2024-05-14 | End: 2024-05-14 | Stop reason: HOSPADM

## 2024-05-14 RX ORDER — POTASSIUM CHLORIDE 7.45 MG/ML
10 INJECTION INTRAVENOUS PRN
Status: DISCONTINUED | OUTPATIENT
Start: 2024-05-14 | End: 2024-05-14 | Stop reason: HOSPADM

## 2024-05-14 RX ORDER — POLYETHYLENE GLYCOL 3350 17 G/17G
17 POWDER, FOR SOLUTION ORAL DAILY PRN
Status: DISCONTINUED | OUTPATIENT
Start: 2024-05-14 | End: 2024-05-14 | Stop reason: HOSPADM

## 2024-05-14 RX ORDER — DOXYCYCLINE HYCLATE 100 MG
100 TABLET ORAL 2 TIMES DAILY
Qty: 20 TABLET | Refills: 0 | Status: SHIPPED | OUTPATIENT
Start: 2024-05-14 | End: 2024-05-24

## 2024-05-14 RX ADMIN — IOPAMIDOL 75 ML: 755 INJECTION, SOLUTION INTRAVENOUS at 07:24

## 2024-05-14 RX ADMIN — SODIUM CHLORIDE, PRESERVATIVE FREE 10 ML: 5 INJECTION INTRAVENOUS at 08:53

## 2024-05-14 RX ADMIN — VANCOMYCIN HYDROCHLORIDE 1250 MG: 10 INJECTION, POWDER, LYOPHILIZED, FOR SOLUTION INTRAVENOUS at 11:00

## 2024-05-14 RX ADMIN — CEFEPIME 2000 MG: 2 INJECTION, POWDER, FOR SOLUTION INTRAVENOUS at 06:46

## 2024-05-14 RX ADMIN — IBUPROFEN 400 MG: 400 TABLET, FILM COATED ORAL at 08:52

## 2024-05-14 ASSESSMENT — PAIN DESCRIPTION - DESCRIPTORS: DESCRIPTORS: ACHING;BURNING;THROBBING

## 2024-05-14 ASSESSMENT — PAIN - FUNCTIONAL ASSESSMENT: PAIN_FUNCTIONAL_ASSESSMENT: PREVENTS OR INTERFERES SOME ACTIVE ACTIVITIES AND ADLS

## 2024-05-14 ASSESSMENT — PAIN SCALES - GENERAL
PAINLEVEL_OUTOF10: 8
PAINLEVEL_OUTOF10: 2
PAINLEVEL_OUTOF10: 8

## 2024-05-14 ASSESSMENT — PAIN DESCRIPTION - LOCATION: LOCATION: ARM

## 2024-05-14 ASSESSMENT — PAIN DESCRIPTION - ORIENTATION: ORIENTATION: RIGHT

## 2024-05-14 NOTE — ED PROVIDER NOTES
allergies.    -------------------------------------------------- RESULTS -------------------------------------------------  All laboratory and radiology results have been personally reviewed by myself   LABS:  Results for orders placed or performed during the hospital encounter of 05/13/24   Culture, Blood 1    Specimen: Blood   Result Value Ref Range    Specimen Description .BLOOD     Special Requests          Culture NO GROWTH <24 HRS    Culture, Blood 2    Specimen: Blood   Result Value Ref Range    Specimen Description .BLOOD     Special Requests          Culture NO GROWTH <24 HRS    CBC with Auto Differential   Result Value Ref Range    WBC 10.4 4.5 - 11.5 k/uL    RBC 4.62 3.80 - 5.80 m/uL    Hemoglobin 14.5 12.5 - 16.5 g/dL    Hematocrit 42.5 37.0 - 54.0 %    MCV 92.0 80.0 - 99.9 fL    MCH 31.4 26.0 - 35.0 pg    MCHC 34.1 32.0 - 34.5 g/dL    RDW 12.9 11.5 - 15.0 %    Platelets 295 130 - 450 k/uL    MPV 9.4 7.0 - 12.0 fL    Neutrophils % 83 (H) 43.0 - 80.0 %    Lymphocytes % 9 (L) 20.0 - 42.0 %    Monocytes % 8 2.0 - 12.0 %    Eosinophils % 0 0 - 6 %    Basophils % 0 0.0 - 2.0 %    Immature Granulocytes % 0 0.0 - 5.0 %    Neutrophils Absolute 8.63 (H) 1.80 - 7.30 k/uL    Lymphocytes Absolute 0.92 (L) 1.50 - 4.00 k/uL    Monocytes Absolute 0.78 0.10 - 0.95 k/uL    Eosinophils Absolute 0.02 (L) 0.05 - 0.50 k/uL    Basophils Absolute 0.03 0.00 - 0.20 k/uL    Immature Granulocytes Absolute 0.04 0.00 - 0.58 k/uL   Comprehensive Metabolic Panel   Result Value Ref Range    Sodium 136 132 - 146 mmol/L    Potassium 4.1 3.5 - 5.0 mmol/L    Chloride 97 (L) 98 - 107 mmol/L    CO2 29 22 - 29 mmol/L    Anion Gap 10 7 - 16 mmol/L    Glucose 115 (H) 74 - 99 mg/dL    BUN 13 6 - 20 mg/dL    Creatinine 0.8 0.70 - 1.20 mg/dL    Est, Glom Filt Rate >90 >60 mL/min/1.73m2    Calcium 9.4 8.6 - 10.2 mg/dL    Total Protein 7.8 6.4 - 8.3 g/dL    Albumin 4.4 3.5 - 5.2 g/dL    Total Bilirubin 0.5 0.0 - 1.2 mg/dL    Alkaline Phosphatase 61

## 2024-05-14 NOTE — PROGRESS NOTES
Pharmacy Consultation Note  (Antibiotic Dosing and Monitoring)    Initial consult date: 5/14/2024  Consulting physician/provider: Dr. Blackman  Drug: Vancomycin  Indication: Skin and Soft Tissue Infection    Age/  Gender Height Weight IBW  Allergy Information   53 y.o./male 175.3 cm (5' 9\") 81.6 kg (180 lb)     Ideal body weight: 70.7 kg (155 lb 13.8 oz)  Adjusted ideal body weight: 75.1 kg (165 lb 8.3 oz)   Patient has no known allergies.      Renal Function:  Recent Labs     05/13/24  2241   BUN 13   CREATININE 0.8     No intake or output data in the 24 hours ending 05/14/24 0453    Vancomycin Monitoring:  Trough:  No results for input(s): \"VANCOTROUGH\" in the last 72 hours.  Random:  No results for input(s): \"VANCORANDOM\" in the last 72 hours.    No results for input(s): \"BLOODCULT2\" in the last 72 hours.     Historical Cultures:  No results found for: \"ORG\"  No results for input(s): \"BC\" in the last 72 hours.    Vancomycin Administration Times:  Recent vancomycin administrations        No vancomycin IV orders with administrations found.            Orders not given:            vancomycin (VANCOCIN) 1,000 mg in sodium chloride 0.9 % 250 mL IVPB ([id number])                    Assessment:  Patient is a 53 y.o. male who has been initiated on vancomycin for Skin and Soft Tissue Infection  Estimated Creatinine Clearance: 109 mL/min (based on SCr of 0.8 mg/dL).  To dose vancomycin, pharmacy will be utilizing [employer] calculation software for goal AUC/BRAD 400-600 mg/L-hr    Plan:  Vancomycin 1250 mg q12h (Predicted AUC/BRAD = 480, Tr = 12.3)  Will check vancomycin levels when appropriate  Will continue to monitor renal function   Pharmacy to follow    Thank you for this consult,    Daniel Aguirre, [facility] 5/14/2024 4:53 AM

## 2024-05-14 NOTE — PROGRESS NOTES
Patient came to the nurses station and said \"Where's that paper, I am signing out now\". Attempted to educate patient on the importance of receiving treatment and patient adamantly refused to talk to nursing. IV taken out and patient signed AMA paperwork. Patient hurriedly left unit. Dr Conley informed of AMA.

## 2024-05-14 NOTE — PLAN OF CARE
Problem: Discharge Planning  Goal: Discharge to home or other facility with appropriate resources  Outcome: Progressing     Problem: Pain  Goal: Verbalizes/displays adequate comfort level or baseline comfort level  5/14/2024 1518 by Hattie Palomares RN  Outcome: Progressing  5/14/2024 0634 by Krissy Clark, RN  Outcome: Progressing

## 2024-05-14 NOTE — DISCHARGE SUMMARY
[facility] Hospitalist Physician Discharge Summary       No follow-up provider specified.    Activity level: As tolerated     Dispo: LEFT AMA        Patient ID:  Dax Phipps  [id number]  53 y.o.  1970    Admit date: 5/14/2024    Discharge date and time:  5/14/2024  5:25 PM    Admission Diagnoses: Principal Problem:    Cellulitis  Resolved Problems:    * No resolved hospital problems. *      Discharge Diagnoses: Principal Problem:    Cellulitis  Resolved Problems:    * No resolved hospital problems. *      Consults:  IP CONSULT TO INTERNAL MEDICINE  IP CONSULT TO ORTHOPEDIC SURGERY  IP CONSULT TO PHARMACY    Hospital Course:   Patient Dax Phipps is a 53 y.o. presented with Cellulitis [L03.90]  Cellulitis of right upper extremity [L03.113]  Laceration of right upper extremity with complication, initial encounter [S41.111A]    Patient is a 53-year-old male with history of IV drug use who was admitted due to RUE cellulitis after recent trauma from a cut tile.  CT showed soft tissue edema and 6 mm metallic foreign body likely needle.  Patient was started on vancomycin and cefepime, cultures obtained.  Discussed case with patient this morning, he stated that he cannot stay here any longer and wants to leave AMA.  Counseled him that we have to wait for the final cultures to come back to know which antibiotics put him on on discharge but patient refused.  Patient signed forms and will be leaving AGAINST MEDICAL ADVICE.  A prescription for doxycycline was sent to his pharmacy.    Discharge Exam:    General Appearance: alert and oriented to person, place and time and in no acute distress  Skin: warm and dry  Head: normocephalic and atraumatic  Eyes: pupils equal, round, and reactive to light, extraocular eye movements intact, conjunctivae normal  Neck: neck supple and non tender without mass   Pulmonary/Chest: clear to auscultation bilaterally- no wheezes, rales or rhonchi, normal air movement, no

## 2024-05-14 NOTE — CONSULTS
Attempted to see patient in room.  He has signed out AGAINST MEDICAL ADVICE.  I did look at the wounds and labs.  No orthopedic intervention necessary.  He does not need to follow-up with me              Fredy Crook DO   Orthopaedic Surgery   5/14/2024  6:04 PM    Note: This report was completed using computerize voiced recognition software.  Every effort has been made to ensure accuracy; however, inadvertent computerized transcription errors may be present.

## 2024-05-14 NOTE — PROGRESS NOTES
4 Eyes Skin Assessment     NAME:  Dax Phipps  YOB: 1970  MEDICAL RECORD NUMBER:  [id number]    The patient is being assessed for  Admission    I agree that at least one RN has performed a thorough Head to Toe Skin Assessment on the patient. ALL assessment sites listed below have been assessed.      Areas assessed by both nurses:    Head, Face, Ears, Shoulders, Back, Chest, Arms, Elbows, Hands, Sacrum. Buttock, Coccyx, Ischium, and Legs. Feet and Heels        Does the Patient have a Wound? Yes wound(s) were present on assessment. LDA wound assessment was Initiated and completed by DANIELA Byrnes Prevention initiated by RN: No  Wound Care Orders initiated by RN: No    Pressure Injury (Stage 3,4, Unstageable, DTI, NWPT, and Complex wounds) if present, place Wound referral order by RN under : No    New Ostomies, if present place, Ostomy referral order under : No     Nurse 1 eSignature: Electronically signed by Krissy Clark RN on 5/14/24 at 6:34 AM EDT    **SHARE this note so that the co-signing nurse can place an eSignature**    Nurse 2 eSignature: Electronically signed by Bryant Han RN on 5/14/24 at 6:48 AM EDT

## 2024-05-14 NOTE — H&P
[facility] Hospitalist Group History and Physical    Perpetual assessment: 53-year-old male with no past medical is presents with right arm swelling.    Assessment and plan:    Cellulitis  -Secondary from recent trauma from a cut tile  -Patient has significant oozing from the site  -Does have significant swelling of the right hand  -X-ray did soft tissue edema dorsal to the mid forearm  -No significant leukocytosis but does have elevated CRP of 17  -Will continue vancomycin and cefepime started in ED  -Consult orthopedics for further evaluation  -Continue pain control with Tylenol and ibuprofen  -Avoid narcotics due to history of IV drug abuse    Tobacco abuse  -Encourage smoking sensation    Code Status: Full  DVT prophylaxis: SCDs      CHIEF COMPLAINT: Right arm swelling    History of Present Illness: This is a 53-year-old male who presents to [facility] with the above-stated complaint.  All history has been obtained from the patient and review of medical records.  Patient states a few days ago he was cutting some tile when he pees cut his arm.  Today he began to have significant swelling from the arm along with oozing from the cut site.  Because that he came to ED for evaluation.  There he was found to have elevated CRP and signs of cellulitis.  Because of significant should cut including his foot that he should brought for IV antibiotics and orthopedic consultation.    Informant(s) for H&P:    REVIEW OF SYSTEMS:  A comprehensive review of systems was negative except for: what is in the HPI      PMH:  No past medical history on file.    Surgical History:  Past Surgical History:   Procedure Laterality Date    NOSE SURGERY         Medications Prior to Admission:    Prior to Admission medications    Not on File       Allergies:    Patient has no known allergies.    Social History:    reports that he has been smoking. He has a 30.0 pack-year smoking history. He has never used smokeless tobacco.

## 2024-05-14 NOTE — PLAN OF CARE
Patient is a 53-year-old male who was admitted due to RUE cellulitis after recent trauma from a cut tile      RUE cellulitis.  Patient noted oozing from site.  CT showed soft tissue edema and a 6 mm metallic foreign body likely needle noted.  Ortho following.  Continue Vanco and cefepime.  Follow cultures.  Pain control  Tobacco abuse.  Encourage cessation  IV drug abuse history. ? Needle remnant noted on CT forearm. Monitor. Counseling provided

## 2024-05-14 NOTE — CARE COORDINATION
Care Coordination    The patient was admitted after he sustained a trauma to the right hand after he was cutting some tile. He was started on Iv cefapime and iv vancomyacin . He has a history of drug abuse. His Crp is 17. He is signing out ama now.

## 2024-05-14 NOTE — PROGRESS NOTES
Dax left so quickly his nurse was unable to give discharge instructions. This nurse called his cell and left voice message notifying him that an oral Antibiotic was order for him and it is at [facility]. This nurse also left Unit phone number for him to call with questions.

## 2024-05-16 LAB
MICROORGANISM SPEC CULT: ABNORMAL
MICROORGANISM SPEC CULT: NORMAL
MICROORGANISM/AGENT SPEC: ABNORMAL
SPECIMEN DESCRIPTION: ABNORMAL
SPECIMEN DESCRIPTION: NORMAL

## 2024-05-19 LAB
MICROORGANISM SPEC CULT: NORMAL
MICROORGANISM SPEC CULT: NORMAL
SERVICE CMNT-IMP: NORMAL
SERVICE CMNT-IMP: NORMAL
SPECIMEN DESCRIPTION: NORMAL
SPECIMEN DESCRIPTION: NORMAL

## 2024-08-14 ENCOUNTER — HOSPITAL ENCOUNTER (EMERGENCY)
Age: 54
Discharge: LEFT BEFORE DISCHARGE | End: 2024-08-14
Attending: EMERGENCY MEDICINE
Payer: COMMERCIAL

## 2024-08-14 VITALS
TEMPERATURE: 98.8 F | BODY MASS INDEX: 25.18 KG/M2 | WEIGHT: 170 LBS | RESPIRATION RATE: 20 BRPM | HEART RATE: 70 BPM | DIASTOLIC BLOOD PRESSURE: 88 MMHG | OXYGEN SATURATION: 98 % | SYSTOLIC BLOOD PRESSURE: 147 MMHG | HEIGHT: 69 IN

## 2024-08-14 DIAGNOSIS — Z53.21 ELOPED FROM EMERGENCY DEPARTMENT: ICD-10-CM

## 2024-08-14 DIAGNOSIS — R07.9 CHEST PAIN, UNSPECIFIED TYPE: Primary | ICD-10-CM

## 2024-08-14 DIAGNOSIS — T14.8XXA WOUND OF SKIN: ICD-10-CM

## 2024-08-14 DIAGNOSIS — F19.10 DRUG ABUSE (HCC): ICD-10-CM

## 2024-08-14 LAB
ALBUMIN SERPL-MCNC: 3.9 G/DL (ref 3.5–5.2)
ALP SERPL-CCNC: 76 U/L (ref 40–129)
ALT SERPL-CCNC: 17 U/L (ref 0–40)
ANION GAP SERPL CALCULATED.3IONS-SCNC: 10 MMOL/L (ref 7–16)
AST SERPL-CCNC: 24 U/L (ref 0–39)
BASOPHILS # BLD: 0.08 K/UL (ref 0–0.2)
BASOPHILS NFR BLD: 1 % (ref 0–2)
BILIRUB SERPL-MCNC: 0.3 MG/DL (ref 0–1.2)
BUN SERPL-MCNC: 15 MG/DL (ref 6–20)
CALCIUM SERPL-MCNC: 8.7 MG/DL (ref 8.6–10.2)
CHLORIDE SERPL-SCNC: 98 MMOL/L (ref 98–107)
CK SERPL-CCNC: 128 U/L (ref 20–200)
CO2 SERPL-SCNC: 28 MMOL/L (ref 22–29)
CREAT SERPL-MCNC: 0.6 MG/DL (ref 0.7–1.2)
EOSINOPHIL # BLD: 0.27 K/UL (ref 0.05–0.5)
EOSINOPHILS RELATIVE PERCENT: 2 % (ref 0–6)
ERYTHROCYTE [DISTWIDTH] IN BLOOD BY AUTOMATED COUNT: 13.2 % (ref 11.5–15)
GFR, ESTIMATED: >90 ML/MIN/1.73M2
GLUCOSE SERPL-MCNC: 120 MG/DL (ref 74–99)
HCT VFR BLD AUTO: 40.7 % (ref 37–54)
HGB BLD-MCNC: 13.6 G/DL (ref 12.5–16.5)
IMM GRANULOCYTES # BLD AUTO: 0.05 K/UL (ref 0–0.58)
IMM GRANULOCYTES NFR BLD: 0 % (ref 0–5)
LYMPHOCYTES NFR BLD: 1.24 K/UL (ref 1.5–4)
LYMPHOCYTES RELATIVE PERCENT: 10 % (ref 20–42)
MCH RBC QN AUTO: 31.1 PG (ref 26–35)
MCHC RBC AUTO-ENTMCNC: 33.4 G/DL (ref 32–34.5)
MCV RBC AUTO: 93.1 FL (ref 80–99.9)
MONOCYTES NFR BLD: 0.93 K/UL (ref 0.1–0.95)
MONOCYTES NFR BLD: 7 % (ref 2–12)
NEUTROPHILS NFR BLD: 80 % (ref 43–80)
NEUTS SEG NFR BLD: 10.5 K/UL (ref 1.8–7.3)
PLATELET # BLD AUTO: 256 K/UL (ref 130–450)
PMV BLD AUTO: 9.3 FL (ref 7–12)
POTASSIUM SERPL-SCNC: 4.5 MMOL/L (ref 3.5–5)
PROT SERPL-MCNC: 6.9 G/DL (ref 6.4–8.3)
RBC # BLD AUTO: 4.37 M/UL (ref 3.8–5.8)
SODIUM SERPL-SCNC: 136 MMOL/L (ref 132–146)
TROPONIN I SERPL HS-MCNC: 7 NG/L (ref 0–11)
WBC OTHER # BLD: 13.1 K/UL (ref 4.5–11.5)

## 2024-08-14 PROCEDURE — 82550 ASSAY OF CK (CPK): CPT

## 2024-08-14 PROCEDURE — 80053 COMPREHEN METABOLIC PANEL: CPT

## 2024-08-14 PROCEDURE — 99284 EMERGENCY DEPT VISIT MOD MDM: CPT

## 2024-08-14 PROCEDURE — 85025 COMPLETE CBC W/AUTO DIFF WBC: CPT

## 2024-08-14 PROCEDURE — 84484 ASSAY OF TROPONIN QUANT: CPT

## 2024-08-14 PROCEDURE — 93005 ELECTROCARDIOGRAM TRACING: CPT

## 2024-08-14 ASSESSMENT — PAIN SCALES - GENERAL: PAINLEVEL_OUTOF10: 8

## 2024-08-14 ASSESSMENT — PAIN DESCRIPTION - LOCATION: LOCATION: CHEST

## 2024-08-14 NOTE — ED PROVIDER NOTES
no discharge medications for this patient.             (Please note that portions of this note were completed with a voice recognition program.  Efforts were made to edit the dictations but occasionally words are mis-transcribed.)    Ness Roberts MD (electronically signed)

## 2024-08-14 NOTE — ED NOTES
Patient no longer in chairs; went outside at this time; educated pt on importance of staying in the chairs so the orders from physician can be done; pt verbalized understanding and kept walking

## 2024-08-15 LAB
EKG ATRIAL RATE: 64 BPM
EKG P AXIS: 41 DEGREES
EKG P-R INTERVAL: 156 MS
EKG Q-T INTERVAL: 424 MS
EKG QRS DURATION: 94 MS
EKG QTC CALCULATION (BAZETT): 437 MS
EKG R AXIS: 60 DEGREES
EKG T AXIS: 49 DEGREES
EKG VENTRICULAR RATE: 64 BPM

## 2024-08-15 PROCEDURE — 93010 ELECTROCARDIOGRAM REPORT: CPT | Performed by: INTERNAL MEDICINE
